# Patient Record
Sex: FEMALE | Race: ASIAN | NOT HISPANIC OR LATINO | Employment: FULL TIME | URBAN - METROPOLITAN AREA
[De-identification: names, ages, dates, MRNs, and addresses within clinical notes are randomized per-mention and may not be internally consistent; named-entity substitution may affect disease eponyms.]

---

## 2017-03-26 ENCOUNTER — OFFICE VISIT (OUTPATIENT)
Dept: URGENT CARE | Facility: PHYSICIAN GROUP | Age: 32
End: 2017-03-26
Payer: COMMERCIAL

## 2017-03-26 VITALS
TEMPERATURE: 97.9 F | SYSTOLIC BLOOD PRESSURE: 118 MMHG | RESPIRATION RATE: 16 BRPM | OXYGEN SATURATION: 98 % | HEART RATE: 60 BPM | DIASTOLIC BLOOD PRESSURE: 66 MMHG

## 2017-03-26 DIAGNOSIS — N92.6 IRREGULAR MENSES: ICD-10-CM

## 2017-03-26 DIAGNOSIS — N64.4 BREAST TENDERNESS: ICD-10-CM

## 2017-03-26 LAB
INT CON NEG: NEGATIVE
INT CON POS: POSITIVE
POC URINE PREGNANCY TEST: NEGATIVE

## 2017-03-26 PROCEDURE — 99214 OFFICE O/P EST MOD 30 MIN: CPT | Mod: 25 | Performed by: FAMILY MEDICINE

## 2017-03-26 PROCEDURE — 81025 URINE PREGNANCY TEST: CPT | Performed by: FAMILY MEDICINE

## 2017-03-26 ASSESSMENT — ENCOUNTER SYMPTOMS
FEVER: 0
ANOREXIA: 0
FATIGUE: 0
ABDOMINAL PAIN: 0
BREAST PAIN: 1
NAUSEA: 0
CHILLS: 1
VOMITING: 0

## 2017-03-26 NOTE — MR AVS SNAPSHOT
Danni Opal   3/26/2017 1:00 PM   Office Visit   MRN: [id number]    Department:  [facility]   Dept Phone:  [phone]    Description:  Female : 1985   Provider:  Bryan Wheeler M.D.           Reason for Visit     Breast Pain breast tenderness, abnormal period      Allergies as of 3/26/2017     Allergen Noted Reactions    Nkda [No Known Drug Allergy] 2013         You were diagnosed with     Breast tenderness   [127622]       Irregular menses   [733941]         Vital Signs     Blood Pressure Pulse Temperature Respirations Oxygen Saturation Smoking Status    118/66 mmHg 60 36.6 °C (97.9 °F) 16 98% Never Smoker       Basic Information     Date Of Birth Sex Race Ethnicity Preferred Language    1985 Female  Non- English      Problem List              ICD-10-CM Priority Class Noted - Resolved    Nipple discharge N64.52   2013 - Present    Acne L70.9   2013 - Present    Vertigo R42   2013 - Present    Gastritis K29.70   2013 - Present    Hyperprolactinemia (CMS-[facility]) E22.1   2013 - Present      Health Maintenance        Date Due Completion Dates    IMM DTaP/Tdap/Td Vaccine (1 - Tdap) 2005 ---    PAP SMEAR 2015 (Prv Comp)    Override on 2012: Previously completed    IMM INFLUENZA (1) 2016 ---            Results     POCT Pregnancy      Component Value Standard Range & Units    POC Urine Pregnancy Test Negative Negative    Internal Control Positive Positive     Internal Control Negative Negative                         Current Immunizations     MMR Vaccine 2014      Below and/or attached are the medications your provider expects you to take. Review all of your home medications and newly ordered medications with your provider and/or pharmacist. Follow medication instructions as directed by your provider and/or pharmacist. Please keep your medication list with you and share with your provider. Update the information when  medications are discontinued, doses are changed, or new medications (including over-the-counter products) are added; and carry medication information at all times in the event of emergency situations     Allergies:  NKDA - (reactions not documented)               Medications  Valid as of: March 26, 2017 -  1:45 PM    Generic Name Brand Name Tablet Size Instructions for use    Meclizine HCl (Tab) ANTIVERT 25 MG Take 1 Tab by mouth 3 times a day as needed.        Omeprazole (CAPSULE DELAYED RELEASE) PRILOSEC 20 MG Take 1 Cap by mouth every day.        .                 Medicines prescribed today were sent to:     [facility]/PHARMACY #[id number] - [place], [place] - [address]    [address] [place] [address]    Phone: [phone] Fax: [phone]    Open 24 Hours?: No      Medication refill instructions:       If your prescription bottle indicates you have medication refills left, it is not necessary to call your provider’s office. Please contact your pharmacy and they will refill your medication.    If your prescription bottle indicates you do not have any refills left, you may request refills at any time through one of the following ways: The online Point system (except Urgent Care), by calling your provider’s office, or by asking your pharmacy to contact your provider’s office with a refill request. Medication refills are processed only during regular business hours and may not be available until the next business day. Your provider may request additional information or to have a follow-up visit with you prior to refilling your medication.   *Please Note: Medication refills are assigned a new Rx number when refilled electronically. Your pharmacy may indicate that no refills were authorized even though a new prescription for the same medication is available at the pharmacy. Please request the medicine by name with the pharmacy before contacting your provider for a refill.           Point Access Code:  [email or web address]  Expires: 4/25/2017  1:45 PM    Your email address is not on file at [facility].  Email Addresses are required for you to sign up for [employer], please contact [phone] to verify your personal information and to provide your email address prior to attempting to register for [employer].    Danni Joseph  [address] [place] [address]    Soci[employer]  A secure, online tool to manage your health information     [employer]’s [employer]® is a secure, online tool that connects you to your personalized health information from the privacy of your home -- day or night - making it very easy for you to manage your healthcare. Once the activation process is completed, you can even access your medical information using the [employer] thuy, which is available for free in the Apple Thuy store or Google Play store.     To learn more about [employer], visit [email or web address]    There are two levels of access available (as shown below):   My Chart Features  [facility] Primary Care Doctor [facility]  Specialists [facility]  Urgent  Care Non-[facility] Primary Care Doctor   Email your healthcare team securely and privately 24/7 X X X    Manage appointments: schedule your next appointment; view details of past/upcoming appointments X      Request prescription refills. X      View recent personal medical records, including lab and immunizations X X X X   View health record, including health history, allergies, medications X X X X   Read reports about your outpatient visits, procedures, consult and ER notes X X X X   See your discharge summary, which is a recap of your hospital and/or ER visit that includes your diagnosis, lab results, and care plan X X  X     How to register for [employer]:  Once your e-mail address has been verified, follow the following steps to sign up for [employer].     1. Go to  [email or web address]  2. Click on the Sign Up Now box, which takes you to the New Member Sign Up page. You will need to provide the following  information:  a. Enter your [employer]â„¢ Access Code exactly as it appears at the top of this page. (You will not need to use this code after you’ve completed the sign-up process. If you do not sign up before the expiration date, you must request a new code.)   b. Enter your date of birth.   c. Enter your home email address.   d. Click Submit, and follow the next screen’s instructions.  3. Create a [employer]â„¢ ID. This will be your [employer]„¢ login ID and cannot be changed, so think of one that is secure and easy to remember.  4. Create a [employer]â„¢ password. You can change your password at any time.  5. Enter your Password Reset Question and Answer. This can be used at a later time if you forget your password.   6. Enter your e-mail address. This allows you to receive e-mail notifications when new information is available in [employer]„¢.  7. Click Sign Up. You can now view your health information.    For assistance activating your [employer]â„[employer] account, call [phone]

## 2017-03-26 NOTE — PROGRESS NOTES
Subjective:      Danni Joseph is a 32 y.o. female who presents with Breast Pain            Breast Pain  This is a new problem. The current episode started more than 1 month ago. The problem occurs constantly. The problem has been waxing and waning. Associated symptoms include chills. Pertinent negatives include no abdominal pain, anorexia, fatigue, fever, nausea or vomiting. Exacerbated by: work/stress. She has tried nothing for the symptoms.       Review of Systems   Constitutional: Positive for chills. Negative for fever and fatigue.   Gastrointestinal: Negative for nausea, vomiting, abdominal pain and anorexia.          Objective:     /66 mmHg  Pulse 60  Temp(Src) 36.6 °C (97.9 °F)  Resp 16  SpO2 98%     Physical Exam   Constitutional: She appears well-developed. No distress.   HENT:   Head: Normocephalic.   Neck: No thyromegaly present.   Cardiovascular: Normal rate, regular rhythm and normal heart sounds.  Exam reveals no friction rub.    No murmur heard.  Pulmonary/Chest: Effort normal. No respiratory distress. She has no wheezes.   Abdominal: Soft. She exhibits no distension. There is no tenderness. There is no rebound and no guarding.   No organomegaly   Neurological: She is alert.   Skin: Skin is dry. No rash noted. She is not diaphoretic.   Psychiatric: She has a normal mood and affect. Her behavior is normal.               Assessment/Plan:     1. Breast tenderness  POCT Pregnancy   2. Irregular menses  TSH+PRL+FSH+TESTT+LH+T4F+DH     prior history of irregular menses.   Patient currently under stress and she is , currently in [place]  CHECK LABS  F/u with PCP and consider imaging as noted in prior visits  Follow-up if symptoms worsen or fail to improve      ADDENDUM:  4/4/17 at 2:25 PM  Called and spoke to patient  All labs normal EXCEPT PROLACTIN   About 10 points higher than last year  Recommend f/u with Dr. Kelsey for consideration of imaging study to consider prolactinoma  Pt  agreed with plan and will f/u with Dr. Kelsey

## 2017-03-29 ENCOUNTER — HOSPITAL ENCOUNTER (OUTPATIENT)
Dept: LAB | Facility: MEDICAL CENTER | Age: 32
End: 2017-03-29
Attending: FAMILY MEDICINE
Payer: COMMERCIAL

## 2017-03-29 LAB
DHEA-S SERPL-MCNC: 218.6 UG/DL (ref 98.8–340)
FSH SERPL-ACNC: 3.3 MIU/ML
LH SERPL-ACNC: 6 IU/L
PROLACTIN SERPL-MCNC: 48.97 NG/ML (ref 2.8–26)
T4 FREE SERPL-MCNC: 0.81 NG/DL (ref 0.53–1.43)
TSH SERPL DL<=0.005 MIU/L-ACNC: 1.11 UIU/ML (ref 0.3–3.7)

## 2017-03-29 PROCEDURE — 84439 ASSAY OF FREE THYROXINE: CPT

## 2017-03-29 PROCEDURE — 83002 ASSAY OF GONADOTROPIN (LH): CPT

## 2017-03-29 PROCEDURE — 84146 ASSAY OF PROLACTIN: CPT

## 2017-03-29 PROCEDURE — 84270 ASSAY OF SEX HORMONE GLOBUL: CPT

## 2017-03-29 PROCEDURE — 84403 ASSAY OF TOTAL TESTOSTERONE: CPT

## 2017-03-29 PROCEDURE — 36415 COLL VENOUS BLD VENIPUNCTURE: CPT

## 2017-03-29 PROCEDURE — 83001 ASSAY OF GONADOTROPIN (FSH): CPT

## 2017-03-29 PROCEDURE — 82627 DEHYDROEPIANDROSTERONE: CPT

## 2017-03-29 PROCEDURE — 84443 ASSAY THYROID STIM HORMONE: CPT

## 2017-03-31 LAB
SHBG SERPL-SCNC: 59 NMOL/L (ref 30–135)
TESTOST FREE SERPL-MCNC: 5.4 PG/ML (ref 1.3–9.2)
TESTOST SERPL-MCNC: 46 NG/DL (ref 9–55)

## 2017-04-05 ENCOUNTER — TELEPHONE (OUTPATIENT)
Dept: MEDICAL GROUP | Facility: PHYSICIAN GROUP | Age: 32
End: 2017-04-05

## 2017-04-05 NOTE — TELEPHONE ENCOUNTER
Patient states she has an appointment next week.  Do you want her to be seen sooner?  Please advise.  Thank you.

## 2017-04-13 ENCOUNTER — OFFICE VISIT (OUTPATIENT)
Dept: MEDICAL GROUP | Facility: PHYSICIAN GROUP | Age: 32
End: 2017-04-13
Payer: COMMERCIAL

## 2017-04-13 VITALS
RESPIRATION RATE: 12 BRPM | OXYGEN SATURATION: 98 % | HEIGHT: 63 IN | SYSTOLIC BLOOD PRESSURE: 110 MMHG | WEIGHT: 116 LBS | HEART RATE: 80 BPM | TEMPERATURE: 97.6 F | BODY MASS INDEX: 20.55 KG/M2 | DIASTOLIC BLOOD PRESSURE: 76 MMHG

## 2017-04-13 DIAGNOSIS — E22.1 HYPERPROLACTINEMIA (HCC): ICD-10-CM

## 2017-04-13 DIAGNOSIS — N64.52 NIPPLE DISCHARGE: ICD-10-CM

## 2017-04-13 PROCEDURE — 99214 OFFICE O/P EST MOD 30 MIN: CPT | Performed by: FAMILY MEDICINE

## 2017-04-13 ASSESSMENT — PATIENT HEALTH QUESTIONNAIRE - PHQ9: CLINICAL INTERPRETATION OF PHQ2 SCORE: 0

## 2017-04-13 NOTE — PROGRESS NOTES
Chief Complaint   Patient presents with   • Follow-Up     labs       HISTORY OF PRESENT ILLNESS: Patient is a 32 y.o. female established patient here today for the following concerns:    1. Hyperprolactinemia (CMS-HCC)  2. Nipple discharge  Patient is here for follow up from .  She began having breast discharge bilaterally several years ago.  She was followed by endocrinology with serial prolactin levels that never seemed to increase.  She did not have imaging of the pituitary.  She reports that eventually the nipple discharge stopped and she stopped following the labs.  She began having nipple discharge again and having irregular periods. She had prolactin level drawn which showed it to be elevated again.  Pregnancy test was negative.  Remainder of hormones appear to be in normal range.  She denies headaches or tunnel vision.       Past Medical, Social, and Family history reviewed and updated in EPIC    Allergies:Nkda    Current Outpatient Prescriptions   Medication Sig Dispense Refill   • meclizine (ANTIVERT) 25 MG TABS Take 1 Tab by mouth 3 times a day as needed. 30 Tab 0   • omeprazole (PRILOSEC) 20 MG CPDR Take 1 Cap by mouth every day. 30 Cap 2     No current facility-administered medications for this visit.         ROS:  Review of Systems   Constitutional: Negative for fever, chills, weight loss and malaise/fatigue.   HENT: Negative for ear pain, nosebleeds, congestion, sore throat and neck pain.    Eyes: Negative for blurred vision.   Respiratory: Negative for cough, sputum production, shortness of breath and wheezing.    Cardiovascular: Negative for chest pain, palpitations,  and leg swelling.   Gastrointestinal: Negative for heartburn, nausea, vomiting, diarrhea and abdominal pain.   Genitourinary: Negative for dysuria, urgency and frequency.   Musculoskeletal: Negative for myalgias, back pain and joint pain.   Skin: Negative for rash and itching.   Neurological: Negative for dizziness, tingling,  "tremors, sensory change, focal weakness and headaches.   Endo/Heme/Allergies: Does not bruise/bleed easily.   Psychiatric/Behavioral: Negative for depression, anxiety, suicidal ideas, insomnia and memory loss.      Exam:  Blood pressure 110/76, pulse 80, temperature 36.4 °C (97.6 °F), resp. rate 12, height 1.6 m (5' 2.99\"), weight 52.617 kg (116 lb), last menstrual period 04/10/2017, SpO2 98 %.    General:  Well nourished, well developed in NAD  Head is grossly normal.  Neck: Supple without JVD   Pulmonary:  Normal effort.   Cardiovascular: Regular rate  Extremities: no clubbing, cyanosis, or edema.  Psych: affect appropriate      Please note that this dictation was created using voice recognition software. I have made every reasonable attempt to correct obvious errors, but I expect that there are errors of grammar and possibly content that I did not discover before finalizing the note.    Assessment/Plan:  1. Hyperprolactinemia (CMS-HCC)  - MR-BRAIN PITUITARY W/WO; Future  - PROLACTIN; Future    2. Nipple discharge  - MR-BRAIN PITUITARY W/WO; Future  - PROLACTIN; Future    Check MRI of pituitary, recheck prolactin in 2-3 months.  May consider bromocriptine or like medication           "

## 2017-04-13 NOTE — MR AVS SNAPSHOT
"Jaydonherbjuliocesar Joseph   2017 4:00 PM   Office Visit   MRN: [id number]    Department:  [facility]   Dept Phone:  [phone]    Description:  Female : 1985   Provider:  Micaela Kelsey M.D.           Reason for Visit     Follow-Up labs      Allergies as of 2017     Allergen Noted Reactions    Nkda [No Known Drug Allergy] 2013         You were diagnosed with     Hyperprolactinemia (CMS-HCC)   [399445]       Nipple discharge   [509098]         Vital Signs     Blood Pressure Pulse Temperature Respirations Height Weight    110/76 mmHg 80 36.4 °C (97.6 °F) 12 1.6 m (5' 2.99\") 52.617 kg (116 lb)    Body Mass Index Oxygen Saturation Last Menstrual Period Smoking Status          20.55 kg/m2 98% 04/10/2017 Never Smoker         Basic Information     Date Of Birth Sex Race Ethnicity Preferred Language    1985 Female  Non- English      Problem List              ICD-10-CM Priority Class Noted - Resolved    Nipple discharge N64.52   2013 - Present    Acne L70.9   2013 - Present    Vertigo R42   2013 - Present    Gastritis K29.70   2013 - Present    Hyperprolactinemia (CMS-HCC) E22.1   2013 - Present      Health Maintenance        Date Due Completion Dates    IMM DTaP/Tdap/Td Vaccine (1 - Tdap) 2005 ---    PAP SMEAR 2015 (Prv Comp)    Override on 2012: Previously completed            Current Immunizations     MMR Vaccine 2014      Below and/or attached are the medications your provider expects you to take. Review all of your home medications and newly ordered medications with your provider and/or pharmacist. Follow medication instructions as directed by your provider and/or pharmacist. Please keep your medication list with you and share with your provider. Update the information when medications are discontinued, doses are changed, or new medications (including over-the-counter products) are added; and carry medication information " at all times in the event of emergency situations     Allergies:  NKDA - (reactions not documented)               Medications  Valid as of: April 13, 2017 -  4:18 PM    Generic Name Brand Name Tablet Size Instructions for use    Meclizine HCl (Tab) ANTIVERT 25 MG Take 1 Tab by mouth 3 times a day as needed.        Omeprazole (CAPSULE DELAYED RELEASE) PRILOSEC 20 MG Take 1 Cap by mouth every day.        .                 Medicines prescribed today were sent to:     [facility]/PHARMACY #[id number] - [place], [place] - [address]    [address] [place] [address]    Phone: [phone] Fax: [phone]    Open 24 Hours?: No      Medication refill instructions:       If your prescription bottle indicates you have medication refills left, it is not necessary to call your provider’s office. Please contact your pharmacy and they will refill your medication.    If your prescription bottle indicates you do not have any refills left, you may request refills at any time through one of the following ways: The online [email or web address] system (except Urgent Care), by calling your provider’s office, or by asking your pharmacy to contact your provider’s office with a refill request. Medication refills are processed only during regular business hours and may not be available until the next business day. Your provider may request additional information or to have a follow-up visit with you prior to refilling your medication.   *Please Note: Medication refills are assigned a new Rx number when refilled electronically. Your pharmacy may indicate that no refills were authorized even though a new prescription for the same medication is available at the pharmacy. Please request the medicine by name with the pharmacy before contacting your provider for a refill.        Your To Do List     Future Labs/Procedures Complete By Expires    MR-BRAIN PITUITARY W/WO  As directed 4/13/2018    PROLACTIN  As directed 4/13/2018         [email or web address] Access Code:  [email or web address]  Expires: 4/25/2017  1:45 PM    [employer]  A secure, online tool to manage your health information     [employer]’s [employer]® is a secure, online tool that connects you to your personalized health information from the privacy of your home -- day or night - making it very easy for you to manage your healthcare. Once the activation process is completed, you can even access your medical information using the [employer] thuy, which is available for free in the Apple Thuy store or Google Play store.     [employer] provides the following levels of access (as shown below):   My Chart Features   [facility] Primary Care Doctor [facility]  Specialists [facility]  Urgent  Care Non-[facility]  Primary Care  Doctor   Email your healthcare team securely and privately 24/7 X X X    Manage appointments: schedule your next appointment; view details of past/upcoming appointments X      Request prescription refills. X      View recent personal medical records, including lab and immunizations X X X X   View health record, including health history, allergies, medications X X X X   Read reports about your outpatient visits, procedures, consult and ER notes X X X X   See your discharge summary, which is a recap of your hospital and/or ER visit that includes your diagnosis, lab results, and care plan. X X       How to register for [employer]:  1. Go to  [email or web address].  2. Click on the Sign Up Now box, which takes you to the New Member Sign Up page. You will need to provide the following information:  a. Enter your [employer] Access Code exactly as it appears at the top of this page. (You will not need to use this code after you’ve completed the sign-up process. If you do not sign up before the expiration date, you must request a new code.)   b. Enter your date of birth.   c. Enter your home email address.   d. Click Submit, and follow the next screen’s instructions.  3. Create a [employer] ID. This will be your [employer] login ID and cannot be  changed, so think of one that is secure and easy to remember.  4. Create a [employer] password. You can change your password at any time.  5. Enter your Password Reset Question and Answer. This can be used at a later time if you forget your password.   6. Enter your e-mail address. This allows you to receive e-mail notifications when new information is available in [employer].  7. Click Sign Up. You can now view your health information.    For assistance activating your [employer] account, call [phone]

## 2017-04-25 ENCOUNTER — HOSPITAL ENCOUNTER (OUTPATIENT)
Dept: RADIOLOGY | Facility: MEDICAL CENTER | Age: 32
End: 2017-04-25
Attending: FAMILY MEDICINE
Payer: COMMERCIAL

## 2017-04-25 DIAGNOSIS — D35.2 PITUITARY ADENOMA (HCC): ICD-10-CM

## 2017-04-25 DIAGNOSIS — E22.1 HYPERPROLACTINEMIA (HCC): ICD-10-CM

## 2017-04-25 DIAGNOSIS — N64.52 NIPPLE DISCHARGE: ICD-10-CM

## 2017-04-25 PROCEDURE — 70553 MRI BRAIN STEM W/O & W/DYE: CPT

## 2017-04-25 PROCEDURE — 700117 HCHG RX CONTRAST REV CODE 255: Performed by: FAMILY MEDICINE

## 2017-04-25 PROCEDURE — A9579 GAD-BASE MR CONTRAST NOS,1ML: HCPCS | Performed by: FAMILY MEDICINE

## 2017-04-25 RX ADMIN — GADODIAMIDE 15 ML: 287 INJECTION INTRAVENOUS at 10:03

## 2017-04-26 ENCOUNTER — TELEPHONE (OUTPATIENT)
Dept: MEDICAL GROUP | Facility: PHYSICIAN GROUP | Age: 32
End: 2017-04-26

## 2017-04-26 RX ORDER — CABERGOLINE 0.5 MG/1
0.25 TABLET ORAL
Qty: 12 TAB | Refills: 0 | Status: SHIPPED | OUTPATIENT
Start: 2017-04-26 | End: 2017-06-06 | Stop reason: SINTOL

## 2017-04-26 NOTE — TELEPHONE ENCOUNTER
Please call Danni, we need to see her to discuss the plan for her nipple discharge and the findings on MRI.  I have started the referral to endocrinology

## 2017-04-26 NOTE — TELEPHONE ENCOUNTER
----- Message from Micaela Kelsey M.D. sent at 4/26/2017  7:52 AM PDT -----  Patient needs follow up this week to discuss treatment plan.

## 2017-05-02 ENCOUNTER — TELEPHONE (OUTPATIENT)
Dept: MEDICAL GROUP | Facility: PHYSICIAN GROUP | Age: 32
End: 2017-05-02

## 2017-05-02 NOTE — TELEPHONE ENCOUNTER
ESTABLISHED PATIENT PRE-VISIT PLANNING     Note: Patient will not be contacted if there is no indication to call.     1.  Reviewed note from last office visit with PCP and/or other med group provider: Yes    2.  If any orders were placed at last visit, do we have Results/Consult Notes?        •  Labs - Labs were not ordered at last office visit.       •  Imaging - Imaging ordered, completed and results are in chart.       •  Referrals - No referrals were ordered at last office visit.    3.  Immunizations were updated in Epic using WebIZ?: Epic matches WebIZ       •  Web Iz Recommendations: TDAP VARICELLA (Chicken Pox)     4.  Patient is due for the following Health Maintenance Topics:   Health Maintenance Due   Topic Date Due   • IMM DTaP/Tdap/Td Vaccine (1 - Tdap) 01/12/2005   • PAP SMEAR  07/31/2015           5.  Patient was not informed to arrive 15 min prior to their scheduled appointment and bring in their medication bottles.

## 2017-05-03 ENCOUNTER — OFFICE VISIT (OUTPATIENT)
Dept: MEDICAL GROUP | Facility: PHYSICIAN GROUP | Age: 32
End: 2017-05-03
Payer: COMMERCIAL

## 2017-05-03 VITALS
DIASTOLIC BLOOD PRESSURE: 80 MMHG | HEART RATE: 70 BPM | OXYGEN SATURATION: 98 % | TEMPERATURE: 98.4 F | BODY MASS INDEX: 20.8 KG/M2 | RESPIRATION RATE: 12 BRPM | WEIGHT: 113 LBS | HEIGHT: 62 IN | SYSTOLIC BLOOD PRESSURE: 120 MMHG

## 2017-05-03 DIAGNOSIS — N64.52 NIPPLE DISCHARGE: ICD-10-CM

## 2017-05-03 DIAGNOSIS — D35.2 PROLACTINOMA (HCC): Primary | ICD-10-CM

## 2017-05-03 DIAGNOSIS — E22.1 HYPERPROLACTINEMIA (HCC): ICD-10-CM

## 2017-05-03 PROCEDURE — 99213 OFFICE O/P EST LOW 20 MIN: CPT | Performed by: NURSE PRACTITIONER

## 2017-05-03 NOTE — MR AVS SNAPSHOT
"Danni Joseph   5/3/2017 4:00 PM   Office Visit   MRN: [id number]    Department:  [facility]   Dept Phone:  [phone]    Description:  Female : 1985   Provider:  EZEQUIEL Dowell           Reason for Visit     Follow-Up MRI      Allergies as of 5/3/2017     Allergen Noted Reactions    Nkda [No Known Drug Allergy] 2013         You were diagnosed with     Prolactinoma (CMS-HCC)   [944145]  -  Primary     Hyperprolactinemia (CMS-HCC)   [846436]       Nipple discharge   [832170]         Vital Signs     Blood Pressure Pulse Temperature Respirations Height Weight    120/80 mmHg 70 36.9 °C (98.4 °F) 12 1.575 m (5' 2\") 51.256 kg (113 lb)    Body Mass Index Oxygen Saturation Last Menstrual Period Breastfeeding? Smoking Status       20.66 kg/m2 98% 04/10/2017 No Never Smoker        Basic Information     Date Of Birth Sex Race Ethnicity Preferred Language    1985 Female  Non- English      Your appointments     2017  4:10 PM   New Patient with Julián Waller M.D.   [facility] End[facility])    [address], [address]  [place] [address] [phone]           Please bring Photo ID, Insurance Cards, All Medication Bottles and copies of any legal documents (such as Living Will, Power of ) If speaking a language besides English please bring an adult . Please arrive 30 minutes prior for check in and registration. You will be receiving a confirmation call a few days before your appointment from our automated call confirmation system.              Problem List              ICD-10-CM Priority Class Noted - Resolved    Nipple discharge N64.52   2013 - Present    Acne L70.9   2013 - Present    Vertigo R42   2013 - Present    Gastritis K29.70   2013 - Present    Hyperprolactinemia (CMS-HCC) E22.1   2013 - Present    Prolactinoma (CMS-HCC) D35.2   5/3/2017 - Present      Health Maintenance    "    Date Due Completion Dates    IMM DTaP/Tdap/Td Vaccine (1 - Tdap) 1/12/2005 ---    PAP SMEAR 7/31/2015 7/31/2012 (Prv Comp)    Override on 7/31/2012: Previously completed            Current Immunizations     MMR Vaccine 1/6/2014      Below and/or attached are the medications your provider expects you to take. Review all of your home medications and newly ordered medications with your provider and/or pharmacist. Follow medication instructions as directed by your provider and/or pharmacist. Please keep your medication list with you and share with your provider. Update the information when medications are discontinued, doses are changed, or new medications (including over-the-counter products) are added; and carry medication information at all times in the event of emergency situations     Allergies:  NKDA - (reactions not documented)               Medications  Valid as of: May 03, 2017 -  4:50 PM    Generic Name Brand Name Tablet Size Instructions for use    Cabergoline (Tab) DOSTINEX 0.5 MG Take 0.5 Tabs by mouth 2X A WEEK.        Meclizine HCl (Tab) ANTIVERT 25 MG Take 1 Tab by mouth 3 times a day as needed.        Omeprazole (CAPSULE DELAYED RELEASE) PRILOSEC 20 MG Take 1 Cap by mouth every day.        .                 Medicines prescribed today were sent to:     [facility]/PHARMACY #[id number] - [place] - [address] [address]    Phone: [phone] Fax: [phone]    Open 24 Hours?: No      Medication refill instructions:       If your prescription bottle indicates you have medication refills left, it is not necessary to call your provider’s office. Please contact your pharmacy and they will refill your medication.    If your prescription bottle indicates you do not have any refills left, you may request refills at any time through one of the following ways: The online [employer] system (except Urgent Care), by calling your provider’s office, or by asking your pharmacy to contact your  provider’s office with a refill request. Medication refills are processed only during regular business hours and may not be available until the next business day. Your provider may request additional information or to have a follow-up visit with you prior to refilling your medication.   *Please Note: Medication refills are assigned a new Rx number when refilled electronically. Your pharmacy may indicate that no refills were authorized even though a new prescription for the same medication is available at the pharmacy. Please request the medicine by name with the pharmacy before contacting your provider for a refill.           [employer] Access Code: Activation code not generated  Current [employer] Status: Active

## 2017-05-03 NOTE — PROGRESS NOTES
Chief Complaint   Patient presents with   • Follow-Up     MRI       HISTORY OF PRESENT ILLNESS: Patient is a 32 y.o. female established patient who presents today to review recent imaging results.  She is a patient of Dr. Kelsey.    1. Prolactinoma (CMS-[facility]) 2. Hyperprolactinemia (CMS-[facility]) 3. Nipple discharge  Patient's most recent imaging results reviewed.  MRI scan of the brain reveals suspicious for 4 millimeter prolactinoma.  Her prolactin levels have been monitored consistently since 2013.  She states that this is the first brain MRI that has been performed.  Suspicion started when patient was expressing irregular menstrual cycles and most recently nipple discharge.  She also reports frequent headaches.  Patient has already scheduled an evaluation with no chronology, she is scheduled for June 12.  In the meantime, her primary care provider, Dr. Kelsey, has recommended that she start Cabergoline 0.25 milligrams twice weekly.  She denies any new or change to her symptoms.  Denies any known family history of similar symptoms.  Patient is not sexually active, she does not have plans to become pregnant anytime soon.    Allergies:Nkda    Current Outpatient Prescriptions Ordered in [facility]   Medication Sig Dispense Refill   • cabergoline (DOSTINEX) 0.5 MG tablet Take 0.5 Tabs by mouth 2X A WEEK. 12 Tab 0   • meclizine (ANTIVERT) 25 MG TABS Take 1 Tab by mouth 3 times a day as needed. 30 Tab 0   • omeprazole (PRILOSEC) 20 MG CPDR Take 1 Cap by mouth every day. 30 Cap 2     No current Epic-ordered facility-administered medications on file.       Past Medical History   Diagnosis Date   • Acne 7/31/2013   • Nipple discharge 7/31/2013       Social History   Substance Use Topics   • Smoking status: Never Smoker    • Smokeless tobacco: Never Used   • Alcohol Use: No       Family Status   Relation Status Death Age   • Mother Alive    • Father Alive      Family History   Problem Relation Age of Onset   • Hypertension Mother   "      ROS: see above    Review of Systems   Constitutional: Negative for fever, chills, weight loss and malaise/fatigue.   HENT: Negative for ear pain, nosebleeds, congestion, sore throat and neck pain.    Eyes: Negative for blurred vision.   Respiratory: Negative for cough, sputum production, shortness of breath and wheezing.    Cardiovascular: Negative for chest pain, palpitations, orthopnea and leg swelling.   Gastrointestinal: Negative for heartburn, nausea, vomiting and abdominal pain.   Genitourinary: Negative for dysuria, urgency and frequency.   Musculoskeletal: Negative for myalgias, back pain and joint pain.   Skin: Negative for rash and itching.   Neurological: Negative for dizziness, tingling, tremors, sensory change, focal weakness and headaches.   Endo/Heme/Allergies: Does not bruise/bleed easily.   Psychiatric/Behavioral: Negative for depression, suicidal ideas and memory loss.  The patient is not nervous/anxious and does not have insomnia.        Exam:  Blood pressure 120/80, pulse 70, temperature 36.9 °C (98.4 °F), resp. rate 12, height 1.575 m (5' 2\"), weight 51.256 kg (113 lb), last menstrual period 04/10/2017, SpO2 98 %, not currently breastfeeding.  General:  Well nourished, well developed female in NAD  Head is grossly normal.  Neck: Thyroid is not enlarged.  Pulmonary: Normal effort.   Cardiovascular: Regular rate.   Extremities: no clubbing, cyanosis, or edema.  Psych:  Mood and affect are normal.  Answering questions appropriately with good eye contact.      Please note that this dictation was created using voice recognition software. I have made every reasonable attempt to correct obvious errors, but I expect that there are errors of grammar and possibly content that I did not discover before finalizing the note.    Assessment/Plan:    1. Prolactinoma (CMS-HCC)     2. Hyperprolactinemia (CMS-HCC)     3. Nipple discharge          1.  Proceed with new medication started as recommended by " Low.  Discussed medication and patient given written information regards to the medication.  2.  Proceed with evaluation by endocrinology.  3.  Try to reassure patient of the benign likelihood of the diagnosis.  4.  Return to clinic with PCP after evaluation by the endocrinologist, sooner if needed.

## 2017-05-18 ENCOUNTER — TELEPHONE (OUTPATIENT)
Dept: MEDICAL GROUP | Facility: PHYSICIAN GROUP | Age: 32
End: 2017-05-18

## 2017-05-18 NOTE — TELEPHONE ENCOUNTER
Patient states that ever since she has started taking the Cabergoline, she has been getting blisters on her lips.  She is also having a burning sensation on her lips.  Please advise.    Thank you.

## 2017-06-01 ENCOUNTER — OFFICE VISIT (OUTPATIENT)
Dept: URGENT CARE | Facility: PHYSICIAN GROUP | Age: 32
End: 2017-06-01
Payer: COMMERCIAL

## 2017-06-01 VITALS
TEMPERATURE: 98.5 F | WEIGHT: 113 LBS | SYSTOLIC BLOOD PRESSURE: 98 MMHG | HEART RATE: 76 BPM | DIASTOLIC BLOOD PRESSURE: 66 MMHG | RESPIRATION RATE: 12 BRPM | OXYGEN SATURATION: 96 % | BODY MASS INDEX: 20.8 KG/M2 | HEIGHT: 62 IN

## 2017-06-01 DIAGNOSIS — Z86.018 HISTORY OF PROLACTINOMA: ICD-10-CM

## 2017-06-01 DIAGNOSIS — L71.0 DERMATITIS, PERIORAL: ICD-10-CM

## 2017-06-01 PROCEDURE — 99214 OFFICE O/P EST MOD 30 MIN: CPT | Performed by: PHYSICIAN ASSISTANT

## 2017-06-01 RX ORDER — METHYLPREDNISOLONE SODIUM SUCCINATE 125 MG/2ML
125 INJECTION, POWDER, LYOPHILIZED, FOR SOLUTION INTRAMUSCULAR; INTRAVENOUS ONCE
Status: COMPLETED | OUTPATIENT
Start: 2017-06-01 | End: 2017-06-01

## 2017-06-01 RX ORDER — METHYLPREDNISOLONE SODIUM SUCCINATE 40 MG/ML
40 INJECTION, POWDER, LYOPHILIZED, FOR SOLUTION INTRAMUSCULAR; INTRAVENOUS ONCE
Status: DISCONTINUED | OUTPATIENT
Start: 2017-06-01 | End: 2017-06-01 | Stop reason: CLARIF

## 2017-06-01 RX ADMIN — METHYLPREDNISOLONE SODIUM SUCCINATE 125 MG: 125 INJECTION, POWDER, LYOPHILIZED, FOR SOLUTION INTRAMUSCULAR; INTRAVENOUS at 18:03

## 2017-06-01 ASSESSMENT — ENCOUNTER SYMPTOMS
WHEEZING: 0
BLURRED VISION: 0
SHORTNESS OF BREATH: 0
EYE REDNESS: 0
ABDOMINAL PAIN: 0
EYE DISCHARGE: 0
FEVER: 0
SPUTUM PRODUCTION: 0
PALPITATIONS: 0
COUGH: 0
VOMITING: 0
CHILLS: 0
HEADACHES: 0
SORE THROAT: 0

## 2017-06-01 NOTE — MR AVS SNAPSHOT
"        Jaydonherbjuliocesar Joseph   2017 5:30 PM   Office Visit   MRN: [id number]    Department:  [facility]   Dept Phone:  [phone]    Description:  Female : 1985   Provider:  Missael Huang PA-C           Reason for Visit     Oral Swelling lip swelling, itching, painful bump.  Now having itching on the face       Allergies as of 2017     Allergen Noted Reactions    Nkda [No Known Drug Allergy] 2013         You were diagnosed with     Dermatitis, perioral   [627763]       History of prolactinoma   [1330615]         Vital Signs     Blood Pressure Pulse Temperature Respirations Height Weight    98/66 mmHg 76 36.9 °C (98.5 °F) 12 1.575 m (5' 2\") 51.256 kg (113 lb)    Body Mass Index Oxygen Saturation Smoking Status             20.66 kg/m2 96% Never Smoker          Basic Information     Date Of Birth Sex Race Ethnicity Preferred Language    1985 Female  Non- English      Your appointments     2017  4:10 PM   New Patient with Julián Waller M.D.   [facility]    [address], [address]  [place] [address] [phone]           Please bring Photo ID, Insurance Cards, All Medication Bottles and copies of any legal documents (such as Living Will, Power of ) If speaking a language besides English please bring an adult . Please arrive 30 minutes prior for check in and registration. You will be receiving a confirmation call a few days before your appointment from our automated call confirmation system.              Problem List              ICD-10-CM Priority Class Noted - Resolved    Nipple discharge N64.52   2013 - Present    Acne L70.9   2013 - Present    Vertigo R42   2013 - Present    Gastritis K29.70   2013 - Present    Hyperprolactinemia (CMS-HCC) E22.1   2013 - Present    Prolactinoma (CMS-[facility]) D35.2   5/3/2017 - Present      Health Maintenance        Date Due Completion " Dates    IMM DTaP/Tdap/Td Vaccine (1 - Tdap) 1/12/2005 ---    PAP SMEAR 7/31/2015 7/31/2012 (Prv Comp)    Override on 7/31/2012: Previously completed            Current Immunizations     MMR Vaccine 1/6/2014      Below and/or attached are the medications your provider expects you to take. Review all of your home medications and newly ordered medications with your provider and/or pharmacist. Follow medication instructions as directed by your provider and/or pharmacist. Please keep your medication list with you and share with your provider. Update the information when medications are discontinued, doses are changed, or new medications (including over-the-counter products) are added; and carry medication information at all times in the event of emergency situations     Allergies:  NKDA - (reactions not documented)               Medications  Valid as of: June 01, 2017 -  7:21 PM    Generic Name Brand Name Tablet Size Instructions for use    Cabergoline (Tab) DOSTINEX 0.5 MG Take 0.5 Tabs by mouth 2X A WEEK.        Meclizine HCl (Tab) ANTIVERT 25 MG Take 1 Tab by mouth 3 times a day as needed.        Omeprazole (CAPSULE DELAYED RELEASE) PRILOSEC 20 MG Take 1 Cap by mouth every day.        .                 Medicines prescribed today were sent to:     [facility]/PHARMACY #[id number] - [place] - [address] [address]    Phone: [phone] Fax: [phone]    Open 24 Hours?: No      Medication refill instructions:       If your prescription bottle indicates you have medication refills left, it is not necessary to call your provider’s office. Please contact your pharmacy and they will refill your medication.    If your prescription bottle indicates you do not have any refills left, you may request refills at any time through one of the following ways: The online [employer] system (except Urgent Care), by calling your provider’s office, or by asking your pharmacy to contact your provider’s office with  a refill request. Medication refills are processed only during regular business hours and may not be available until the next business day. Your provider may request additional information or to have a follow-up visit with you prior to refilling your medication.   *Please Note: Medication refills are assigned a new Rx number when refilled electronically. Your pharmacy may indicate that no refills were authorized even though a new prescription for the same medication is available at the pharmacy. Please request the medicine by name with the pharmacy before contacting your provider for a refill.           [employer] Access Code: Activation code not generated  Current [employer] Status: Active

## 2017-06-02 NOTE — PROGRESS NOTES
"Subjective:      Danni Joseph is a 32 y.o. female who presents with Oral Swelling            HPI   Pt is 33 y/o female who presents itchy lips and swelling since last night. Pt. Reports hx of same after being started on recent Cabergoline- however she discontinued this several days ago.   She reports small little blisters this morning, however putting on vaseline this seemed to help some. She denies any tongue swelling, however she did bite her the tip of her tongue the other day and developed a small little sore on the tip. She denies any other rash, SOB, or wheezing.     Review of Systems   Constitutional: Negative for fever, chills and malaise/fatigue.   HENT: Negative for ear discharge, ear pain and sore throat.         Lip swelling and itching.    Eyes: Negative for blurred vision, discharge and redness.   Respiratory: Negative for cough, sputum production, shortness of breath and wheezing.    Cardiovascular: Negative for chest pain and palpitations.   Gastrointestinal: Negative for vomiting and abdominal pain.   Skin: Negative for itching and rash.   Neurological: Negative for headaches.          Objective:     BP 98/66 mmHg  Pulse 76  Temp(Src) 36.9 °C (98.5 °F)  Resp 12  Ht 1.575 m (5' 2\")  Wt 51.256 kg (113 lb)  BMI 20.66 kg/m2  SpO2 96%   PMH:  has a past medical history of Acne (7/31/2013) and Nipple discharge (7/31/2013).  MEDS:   Current outpatient prescriptions:   •  cabergoline (DOSTINEX) 0.5 MG tablet, Take 0.5 Tabs by mouth 2X A WEEK., Disp: 12 Tab, Rfl: 0  •  meclizine (ANTIVERT) 25 MG TABS, Take 1 Tab by mouth 3 times a day as needed., Disp: 30 Tab, Rfl: 0  •  omeprazole (PRILOSEC) 20 MG CPDR, Take 1 Cap by mouth every day., Disp: 30 Cap, Rfl: 2    Current facility-administered medications:   •  methylPREDNISolone sod succ (SOLU-MEDROL) 125 MG injection 125 mg, 125 mg, Intramuscular, Once, Missael Huang PA-C  ALLERGIES:   Allergies   Allergen Reactions   • Nkda [No Known Drug Allergy]  "     SURGHX:   Past Surgical History   Procedure Laterality Date   • Eye surgery       KENNY     SOCHX:  reports that she has never smoked. She has never used smokeless tobacco. She reports that she does not drink alcohol or use illicit drugs.  FH: Family history was reviewed, no pertinent findings to report    Physical Exam   Constitutional: She is oriented to person, place, and time. She appears well-developed and well-nourished.   HENT:   Head: Normocephalic and atraumatic.   Right Ear: External ear normal.   Left Ear: External ear normal.   Nose: Nose normal.   Mouth/Throat: Oropharynx is clear and moist. No oropharyngeal exudate.   Small ulceration to the tip of the tongue. Upper and lower lips- minimal edema- without evidence of other skin changes. Other mucosa= without lesions/vesicles or skin changes.    Eyes: EOM are normal. Pupils are equal, round, and reactive to light.   Neck: Normal range of motion. Neck supple.   Cardiovascular: Normal rate and regular rhythm.    Pulmonary/Chest: Effort normal and breath sounds normal. No respiratory distress.   Musculoskeletal: Normal range of motion. She exhibits no edema.   Lymphadenopathy:     She has no cervical adenopathy.   Neurological: She is alert and oriented to person, place, and time.   Skin: Skin is warm. No rash noted.   Psychiatric: She has a normal mood and affect. Her behavior is normal.   Vitals reviewed.              Assessment/Plan:     1. Dermatitis, perioral  - methylPREDNISolone sod succ (SOLU-MEDROL) 125 MG injection 125 mg; 2 mL by Intramuscular route Once.    At this time the Cabergoline should no longer be a causation- suspect perioral derm. At this time- will trial steroid- she is to start on Zantac and Benadryl- utilize vaseline- avoid itching.   Pt. Without any mucosa edema, stridor, or wheezing.   Pt .is to keep upcoming appt. With Endocrinology for further discussion about medications- and is to avoid further use of Cabergoline.    Patient given precautionary s/sx that mandate immediate follow up and evaluation in the ED. Advised of risks of not doing so.    DDX, Supportive care, and indications for immediate follow-up discussed with patient.    Instructed to return to clinic or nearest emergency department if we are not available for any change in condition, further concerns, or worsening of symptoms.    The patient demonstrated a good understanding and agreed with the treatment plan.

## 2017-06-06 ENCOUNTER — OFFICE VISIT (OUTPATIENT)
Dept: ENDOCRINOLOGY | Facility: MEDICAL CENTER | Age: 32
End: 2017-06-06
Payer: COMMERCIAL

## 2017-06-06 VITALS
SYSTOLIC BLOOD PRESSURE: 118 MMHG | WEIGHT: 116 LBS | OXYGEN SATURATION: 97 % | BODY MASS INDEX: 20.55 KG/M2 | DIASTOLIC BLOOD PRESSURE: 70 MMHG | HEIGHT: 63 IN | HEART RATE: 85 BPM

## 2017-06-06 DIAGNOSIS — E22.1 HYPERPROLACTINEMIA (HCC): ICD-10-CM

## 2017-06-06 DIAGNOSIS — E55.9 VITAMIN D DEFICIENCY: ICD-10-CM

## 2017-06-06 DIAGNOSIS — R53.83 FATIGUE, UNSPECIFIED TYPE: ICD-10-CM

## 2017-06-06 DIAGNOSIS — E53.8 VITAMIN B12 DEFICIENCY: ICD-10-CM

## 2017-06-06 DIAGNOSIS — E27.1 ADRENAL INSUFFICIENCY (ADDISON'S DISEASE) (HCC): ICD-10-CM

## 2017-06-06 DIAGNOSIS — D35.2 PROLACTINOMA (HCC): ICD-10-CM

## 2017-06-06 DIAGNOSIS — N64.52 NIPPLE DISCHARGE: ICD-10-CM

## 2017-06-06 PROCEDURE — 99204 OFFICE O/P NEW MOD 45 MIN: CPT | Performed by: INTERNAL MEDICINE

## 2017-06-06 RX ORDER — BROMOCRIPTINE MESYLATE 2.5 MG/1
2.5 TABLET ORAL DAILY
Qty: 30 TAB | Refills: 3 | Status: SHIPPED | OUTPATIENT
Start: 2017-06-06 | End: 2017-07-06

## 2017-06-06 NOTE — MR AVS SNAPSHOT
"Jaydonherbjuliocesar Joseph   2017 4:30 PM   Office Visit   MRN: [id number]    Department:  Endocrinology [facility]   Dept Phone:  [phone]    Description:  Female : 1985   Provider:  Julián Waller M.D.           Allergies as of 2017     Allergen Noted Reactions    Benadryl Allergy 2017       Nkda [No Known Drug Allergy] 2013         You were diagnosed with     Hyperprolactinemia (CMS-HCC)   [914338]       Prolactinoma (CMS-HCC)   [642091]       Nipple discharge   [647595]       Vitamin D deficiency   [0475947]       Vitamin B12 deficiency   [528021]       Fatigue, unspecified type   [8228814]       Adrenal insufficiency (Kevin's disease) (CMS-HCC)   [559946]         Vital Signs     Blood Pressure Pulse Height Weight Body Mass Index Oxygen Saturation    118/70 mmHg 85 1.6 m (5' 2.99\") 52.617 kg (116 lb) 20.55 kg/m2 97%    Smoking Status                   Never Smoker            Basic Information     Date Of Birth Sex Race Ethnicity Preferred Language    1985 Female  Non- English      Your appointments     2017  5:10 PM   Established Patient with Julián Waller M.D.   [facility]    [address], [address]  [place] [address] [phone]           You will be receiving a confirmation call a few days before your appointment from our automated call confirmation system.              Problem List              ICD-10-CM Priority Class Noted - Resolved    Nipple discharge N64.52   2013 - Present    Acne L70.9   2013 - Present    Vertigo R42   2013 - Present    Gastritis K29.70   2013 - Present    Hyperprolactinemia (CMS-HCC) E22.1   2013 - Present    Prolactinoma (CMS-HCC) D35.2   5/3/2017 - Present      Health Maintenance        Date Due Completion Dates    IMM DTaP/Tdap/Td Vaccine (1 - Tdap) 2005 ---    PAP SMEAR 2015 (Prv Comp)    Override on 2012: Previously " completed            Current Immunizations     MMR Vaccine 1/6/2014      Below and/or attached are the medications your provider expects you to take. Review all of your home medications and newly ordered medications with your provider and/or pharmacist. Follow medication instructions as directed by your provider and/or pharmacist. Please keep your medication list with you and share with your provider. Update the information when medications are discontinued, doses are changed, or new medications (including over-the-counter products) are added; and carry medication information at all times in the event of emergency situations     Allergies:  BENADRYL ALLERGY - (reactions not documented)     NKDA - (reactions not documented)               Medications  Valid as of: June 06, 2017 -  5:09 PM    Generic Name Brand Name Tablet Size Instructions for use    Bromocriptine Mesylate (Tab) PARLODEL 2.5 MG Take 1 Tab by mouth every day for 30 days.        .                 Medicines prescribed today were sent to:     [facility]/PHARMACY #[id number] - [place] - [address]    [address] [place] [address]    Phone: [phone] Fax: [phone]    Open 24 Hours?: No      Medication refill instructions:       If your prescription bottle indicates you have medication refills left, it is not necessary to call your provider’s office. Please contact your pharmacy and they will refill your medication.    If your prescription bottle indicates you do not have any refills left, you may request refills at any time through one of the following ways: The online [employer] system (except Urgent Care), by calling your provider’s office, or by asking your pharmacy to contact your provider’s office with a refill request. Medication refills are processed only during regular business hours and may not be available until the next business day. Your provider may request additional information or to have a follow-up visit with you prior to refilling  your medication.   *Please Note: Medication refills are assigned a new Rx number when refilled electronically. Your pharmacy may indicate that no refills were authorized even though a new prescription for the same medication is available at the pharmacy. Please request the medicine by name with the pharmacy before contacting your provider for a refill.        Your To Do List     Future Labs/Procedures Complete By Expires    ACTH  As directed 6/6/2018    CORTISOL  As directed 6/6/2018    PROLACTIN  As directed 6/6/2018    VITAMIN B12  As directed 6/6/2018    VITAMIN D,25 HYDROXY  As directed 6/6/2018         [employer] Access Code: Activation code not generated  Current [employer] Status: Active

## 2017-06-07 NOTE — PROGRESS NOTES
New Patient Consult Note  Primary care physician: Micaela Kelsey M.D.    Reason for consult: Hyperprolactinemia     HPI:  Danni Joseph is a 32 y.o. old patient who comes in today for evaluation of hyperprolactinemia on cabergoline therapy. It seems that she may be having some type of allergic reaction to cabergoline. At times she takes it she develops jade -oral and lip swelling that last for days. If she stops the cabergoline swelling goes away gradually and it comes back again when she takes the subsequent dose.  The swelling is relieved by Benadryl. She denies any nipple discharge since the last one week. She does state to have some nipple discharge a month ago. She also complains of occasional dizziness he states to have this complaint for a very long time even before she came to [place] few years ago. In [place] she was told that her dizziness is from low blood pressure. This dizziness is unrelated to her cabergoline as she had even before the start of the treatment. She also complains of feeling nauseous every single morning since last years. Does state that her nausea gets worse when she is sick for example when she has flulike symptoms or upper respiratory tract infections etc. she does complain of extreme fatigue throughout the day. She sleeps for many hours but she never feels refreshed and she wakes up tired every day in the morning.    ROS:  Constitutional: Fatigue, No weight loss  Cardiac: No palpitations or racing heart  Resp: No shortness of breath  Neuro:On-and-off dizziness, No numbness or tinging in feet  Breast: No Nipple Discharge since last one week, nipple discharge on and off prior to that  Endo: No heat or cold intolerance, no polyuria or polydipsia  GI: Early morning nausea every single day    All other systems were reviewed and were negative.    Past Medical History:  Patient Active Problem List    Diagnosis Date Noted   • Prolactinoma (CMS-[facility]) 05/03/2017   • Vertigo 11/05/2013   •  "Gastritis 11/05/2013   • Hyperprolactinemia (CMS-HCC) 11/05/2013   • Nipple discharge 07/31/2013   • Acne 07/31/2013       Past Surgical History:  Past Surgical History   Procedure Laterality Date   • Eye surgery       LASIC       Allergies:  Benadryl allergy and Nkda    Social History:  Social History     Social History   • Marital Status: Single     Spouse Name: N/A   • Number of Children: N/A   • Years of Education: N/A     Occupational History   •        AT [employer]      Social History Main Topics   • Smoking status: Never Smoker    • Smokeless tobacco: Never Used   • Alcohol Use: No   • Drug Use: No   • Sexual Activity: Not Currently     Other Topics Concern   • Not on file     Social History Narrative       Family History:  Family History   Problem Relation Age of Onset   • Hypertension Mother        Medications:    Current outpatient prescriptions:   •  bromocriptine (PARLODEL) 2.5 MG Tab, Take 1 Tab by mouth every day for 30 days., Disp: 30 Tab, Rfl: 3    Labs:    Physical Examination:  Vital signs: /70 mmHg  Pulse 85  Ht 1.6 m (5' 2.99\")  Wt 52.617 kg (116 lb)  BMI 20.55 kg/m2  SpO2 97%  General: No apparent distress, cooperative, pain  Eyes: No scleral icterus or discharge  ENMT: Normal on external inspection of nose, swelling and redness of the lips and perioral area present  Neck: No abnormal masses on inspection  Resp: Normal effort, clear to auscultation bilaterally   CVS: Regular rate and rhythm, S1 S2 normal, no murmur   Bilateral breast exam: Was done in presence of medical assistant Micaela Mays. Patient was made to feel comfortable and explained the reason for the exam. Patient understands and agreed. No Nipple discharge on stimulation of both nipples.  Extremities: No edema  Neuro:  Alert and oriented, normal deep tendon reflexes  Skin: No rash  Psych: Normal mood and affect    Assessment and Plan:    1. Hyperprolactinemia (CMS-HCC)  Secondary to micro-prolactinoma. She is probably " allergic to cabergoline. We will stop cabergoline and start bromocriptine side effects and benefits of bromocriptine was discussed with patient in detail. Recent MRI does show shrinkage of the micro-prolactinoma. Appropriate prolactin levels before next visit    2. Prolactinoma (CMS-[facility])  Plan as per above    3. Nipple discharge  No nipple discharge currently. Plan is per above    4. Vitamin D deficiency  Rule out vitamin D deficiency as a contributory factor for fatigue.     5. Vitamin B12 deficiency  Rule out vitamin B12 deficiency is contributory factor for fatigue.    6. Fatigue, unspecified type  Plan as per above.    7. Adrenal insufficiency (Kevin's disease) (CMS-[facility])  Adrenal insufficiency can also lead to extreme fatigue, nausea as described by the patient. Can also lead to dizziness which the patient has been having for a long time. Rule it out.      Return in about 6 weeks (around 7/18/2017).    Thank you for allowing me to participate in the care of this patient.    Julián Waller M.D.  06/06/2017    CC:   Micaela Kelsey M.D.    This note was created using voice recognition software (Dragon). The accuracy of the dictation is limited by the abilities of the software. I have reviewed the note prior to signing, however some errors in grammar and context are still possible. If you have any questions related to this note please do not hesitate to contact our office.

## 2017-06-09 ENCOUNTER — HOSPITAL ENCOUNTER (OUTPATIENT)
Dept: LAB | Facility: MEDICAL CENTER | Age: 32
End: 2017-06-09
Attending: INTERNAL MEDICINE
Payer: COMMERCIAL

## 2017-06-09 DIAGNOSIS — E27.1 ADRENAL INSUFFICIENCY (ADDISON'S DISEASE) (HCC): ICD-10-CM

## 2017-06-09 DIAGNOSIS — R53.83 FATIGUE, UNSPECIFIED TYPE: ICD-10-CM

## 2017-06-09 DIAGNOSIS — N64.52 NIPPLE DISCHARGE: ICD-10-CM

## 2017-06-09 DIAGNOSIS — E55.9 VITAMIN D DEFICIENCY: ICD-10-CM

## 2017-06-09 DIAGNOSIS — E22.1 HYPERPROLACTINEMIA (HCC): ICD-10-CM

## 2017-06-09 DIAGNOSIS — D35.2 PROLACTINOMA (HCC): ICD-10-CM

## 2017-06-09 DIAGNOSIS — E53.8 VITAMIN B12 DEFICIENCY: ICD-10-CM

## 2017-06-09 LAB
25(OH)D3 SERPL-MCNC: 6 NG/ML (ref 30–100)
CORTIS SERPL-MCNC: 8.6 UG/DL (ref 0–23)
PROLACTIN SERPL-MCNC: 7.97 NG/ML (ref 2.8–26)
VIT B12 SERPL-MCNC: 360 PG/ML (ref 211–911)

## 2017-06-09 PROCEDURE — 82306 VITAMIN D 25 HYDROXY: CPT

## 2017-06-09 PROCEDURE — 82533 TOTAL CORTISOL: CPT

## 2017-06-09 PROCEDURE — 36415 COLL VENOUS BLD VENIPUNCTURE: CPT

## 2017-06-09 PROCEDURE — 84146 ASSAY OF PROLACTIN: CPT

## 2017-06-09 PROCEDURE — 82607 VITAMIN B-12: CPT

## 2017-06-09 PROCEDURE — 82024 ASSAY OF ACTH: CPT

## 2017-06-09 RX ORDER — ERGOCALCIFEROL 1.25 MG/1
50000 CAPSULE ORAL
Qty: 18 CAP | Refills: 0 | Status: SHIPPED | OUTPATIENT
Start: 2017-06-09 | End: 2017-07-26 | Stop reason: SDUPTHER

## 2017-06-11 LAB — ACTH PLAS-MCNC: 11 PG/ML (ref 6–58)

## 2017-07-18 ENCOUNTER — OFFICE VISIT (OUTPATIENT)
Dept: ENDOCRINOLOGY | Facility: MEDICAL CENTER | Age: 32
End: 2017-07-18
Payer: COMMERCIAL

## 2017-07-18 VITALS
OXYGEN SATURATION: 96 % | SYSTOLIC BLOOD PRESSURE: 116 MMHG | HEART RATE: 80 BPM | WEIGHT: 116 LBS | DIASTOLIC BLOOD PRESSURE: 78 MMHG | HEIGHT: 63 IN | BODY MASS INDEX: 20.55 KG/M2

## 2017-07-18 DIAGNOSIS — N64.52 NIPPLE DISCHARGE: ICD-10-CM

## 2017-07-18 DIAGNOSIS — E55.9 VITAMIN D DEFICIENCY: ICD-10-CM

## 2017-07-18 DIAGNOSIS — R53.83 FATIGUE, UNSPECIFIED TYPE: ICD-10-CM

## 2017-07-18 DIAGNOSIS — E22.1 HYPERPROLACTINEMIA (HCC): ICD-10-CM

## 2017-07-18 PROCEDURE — 99214 OFFICE O/P EST MOD 30 MIN: CPT | Performed by: INTERNAL MEDICINE

## 2017-07-18 RX ORDER — BROMOCRIPTINE MESYLATE 2.5 MG/1
2.5 TABLET ORAL DAILY
Qty: 30 TAB | Refills: 2 | Status: SHIPPED | OUTPATIENT
Start: 2017-07-18 | End: 2017-09-06 | Stop reason: SDUPTHER

## 2017-07-18 NOTE — MR AVS SNAPSHOT
"        Funmijuliocesar Joseph   2017 5:10 PM   Office Visit   MRN: [id number]    Department:  Endocrinology [facility]   Dept Phone:  [phone]    Description:  Female : 1985   Provider:  Julián Waller M.D.           Allergies as of 2017     Allergen Noted Reactions    Benadryl Allergy 2017       Nkda [No Known Drug Allergy] 2013         You were diagnosed with     Vitamin D deficiency   [2492179]       Hyperprolactinemia (CMS-HCC)   [537131]       Nipple discharge   [315183]       Fatigue, unspecified type   [5075877]         Vital Signs     Blood Pressure Pulse Height Weight Body Mass Index Oxygen Saturation    116/78 mmHg 80 1.598 m (5' 2.91\") 52.617 kg (116 lb) 20.60 kg/m2 96%    Smoking Status                   Never Smoker            Basic Information     Date Of Birth Sex Race Ethnicity Preferred Language    1985 Female  Non- English      Your appointments     Sep 06, 2017  2:50 PM   Established Patient with Julián Waller M.D.   [facility]    [id number] [place] [address], [address]  [facility] [id number] [phone]           You will be receiving a confirmation call a few days before your appointment from our automated call confirmation system.              Problem List              ICD-10-CM Priority Class Noted - Resolved    Nipple discharge N64.52   2013 - Present    Acne L70.9   2013 - Present    Vertigo R42   2013 - Present    Gastritis K29.70   2013 - Present    Hyperprolactinemia (CMS-HCC) E22.1   2013 - Present    Prolactinoma (CMS-HCC) D35.2   5/3/2017 - Present      Health Maintenance        Date Due Completion Dates    IMM DTaP/Tdap/Td Vaccine (1 - Tdap) 2005 ---    PAP SMEAR 2015 (Prv Comp)    Override on 2012: Previously completed    IMM INFLUENZA (1) 2017 ---            Current Immunizations     MMR Vaccine 2014      Below and/or attached are the medications " your provider expects you to take. Review all of your home medications and newly ordered medications with your provider and/or pharmacist. Follow medication instructions as directed by your provider and/or pharmacist. Please keep your medication list with you and share with your provider. Update the information when medications are discontinued, doses are changed, or new medications (including over-the-counter products) are added; and carry medication information at all times in the event of emergency situations     Allergies:  BENADRYL ALLERGY - (reactions not documented)     NKDA - (reactions not documented)               Medications  Valid as of: July 18, 2017 -  5:34 PM    Generic Name Brand Name Tablet Size Instructions for use    Bromocriptine Mesylate (Tab) PARLODEL 2.5 MG Take 1 Tab by mouth every day for 30 days.        Ergocalciferol (Cap) DRISDOL 85714 UNITS Take 1 Cap by mouth every 3 days for 56 days.        .                 Medicines prescribed today were sent to:     [id number] - [place], [place] - [address]    [address] [place] [address]    Phone: [phone] Fax: [phone]    Open 24 Hours?: No      Medication refill instructions:       If your prescription bottle indicates you have medication refills left, it is not necessary to call your provider’s office. Please contact your pharmacy and they will refill your medication.    If your prescription bottle indicates you do not have any refills left, you may request refills at any time through one of the following ways: The online [employer] system (except Urgent Care), by calling your provider’s office, or by asking your pharmacy to contact your provider’s office with a refill request. Medication refills are processed only during regular business hours and may not be available until the next business day. Your provider may request additional information or to have a follow-up visit with you prior to refilling your medication.      *Please Note: Medication refills are assigned a new Rx number when refilled electronically. Your pharmacy may indicate that no refills were authorized even though a new prescription for the same medication is available at the pharmacy. Please request the medicine by name with the pharmacy before contacting your provider for a refill.        Your To Do List     Future Labs/Procedures Complete By Expires    PROLACTIN  As directed 7/18/2018    VITAMIN D,25 HYDROXY  As directed 7/18/2018         MyChart Access Code: Activation code not generated  Current [employer] Status: Active

## 2017-07-19 NOTE — PROGRESS NOTES
"Endocrinology Clinic Progress Note  PCP: Micaela Kelsey M.D.    HPI:  Danni Joseph is a 32 y.o. old patient who comes in today for routine follow up for bilateral galactorrhea and high prolactin levels.  She had some sort of allergic reaction to cabergoline. This was stopped in the previous visit. Currently on bromocriptine therapy and this has helped with her bilateral galactorrhea and she has not noticed any breast discharge.    She has severe vitamin D deficiency. Her vitamin D levels are in single digits i.e. 6. She is currently on the loading dose of vitamin D. Still continues to feel tired and fatigued but she has just started taking vitamin D.    ROS:  Constitutional: Fatigue, No unintentional weight loss  Endo: Denies excessive thirst or frequent urination    Past Medical History:  Patient Active Problem List    Diagnosis Date Noted   • Prolactinoma (CMS-HCC) 05/03/2017   • Vertigo 11/05/2013   • Gastritis 11/05/2013   • Hyperprolactinemia (CMS-HCC) 11/05/2013   • Nipple discharge 07/31/2013   • Acne 07/31/2013       Medications:    Current outpatient prescriptions:   •  bromocriptine (PARLODEL) 2.5 MG Tab, Take 1 Tab by mouth every day for 30 days., Disp: 30 Tab, Rfl: 2  •  vitamin D, Ergocalciferol, (DRISDOL) 26794 UNITS Cap capsule, Take 1 Cap by mouth every 3 days for 56 days., Disp: 18 Cap, Rfl: 0    Labs:     Physical Examination:  Vital signs: /78 mmHg  Pulse 80  Ht 1.598 m (5' 2.91\")  Wt 52.617 kg (116 lb)  BMI 20.60 kg/m2  SpO2 96% Body mass index is 20.6 kg/(m^2).  General: No apparent distress, cooperative  Eyes: No scleral icterus, no discharge, normal eyelids  Neck: No abnormal masses on inspection, normal thyroid exam  Resp: Normal effort, clear to auscultation bilaterally  CVS: Regular rate and rhythm, S1 S2 normal, no murmur  Extremities: No lower extremity edema  Abdomen: noabdominal obesity present  Musculoskeletal: Normal digits and nails  Skin: No rash on visible skin  Psych: " Alert and oriented, normal mood and affect, intact memory and able to make informed decisions.    Assessment and Plan:    1. Vitamin D deficiency  Continue loading dose as prescribed. Her fatigue should improve once she completes the loading dose and her levels are at least 30 and above.    - VITAMIN D,25 HYDROXY; Future    2. Hyperprolactinemia (CMS-HCC)  Continue bromocriptine daily.  - PROLACTIN; Future    3. Nipple discharge  continue bromocriptine.  Nipple discharge is resolved.    4. Fatigue, unspecified type  Her fatigue is multifactorial. One of The major causes could be low vitamin D levels as above.  Also encouraged her to exercise regularly and some of her fatigue could also be from physical deconditioning from lack of exercise.      Return in about 2 months (around 9/18/2017).    Thank you for allowing me to participate in the care of this patient.    Julián Waller M.D.    CC:   Micaela Kelsey M.D.    This note was created using voice recognition software (Dragon). The accuracy of the dictation is limited by the abilities of the software. I have reviewed the note prior to signing, however some errors in grammar and context are still possible. If you have any questions related to this note please do not hesitate to contact our office.

## 2017-07-26 DIAGNOSIS — E55.9 VITAMIN D DEFICIENCY: ICD-10-CM

## 2017-07-26 RX ORDER — ERGOCALCIFEROL 1.25 MG/1
50000 CAPSULE ORAL
Qty: 18 CAP | Refills: 0 | Status: SHIPPED | OUTPATIENT
Start: 2017-07-26 | End: 2017-09-06 | Stop reason: SDUPTHER

## 2017-09-06 ENCOUNTER — OFFICE VISIT (OUTPATIENT)
Dept: ENDOCRINOLOGY | Facility: MEDICAL CENTER | Age: 32
End: 2017-09-06
Payer: COMMERCIAL

## 2017-09-06 VITALS
HEIGHT: 63 IN | HEART RATE: 92 BPM | OXYGEN SATURATION: 96 % | BODY MASS INDEX: 20.38 KG/M2 | WEIGHT: 115 LBS | SYSTOLIC BLOOD PRESSURE: 106 MMHG | DIASTOLIC BLOOD PRESSURE: 72 MMHG

## 2017-09-06 DIAGNOSIS — E55.9 VITAMIN D DEFICIENCY: ICD-10-CM

## 2017-09-06 DIAGNOSIS — R53.83 FATIGUE, UNSPECIFIED TYPE: ICD-10-CM

## 2017-09-06 DIAGNOSIS — E22.1 HYPERPROLACTINEMIA (HCC): ICD-10-CM

## 2017-09-06 PROCEDURE — 99214 OFFICE O/P EST MOD 30 MIN: CPT | Performed by: INTERNAL MEDICINE

## 2017-09-06 RX ORDER — ERGOCALCIFEROL 1.25 MG/1
50000 CAPSULE ORAL
Qty: 18 CAP | Refills: 0 | Status: SHIPPED | OUTPATIENT
Start: 2017-09-06 | End: 2017-11-01

## 2017-09-06 RX ORDER — BROMOCRIPTINE MESYLATE 2.5 MG/1
2.5 TABLET ORAL DAILY
Qty: 30 TAB | Refills: 5 | Status: SHIPPED | OUTPATIENT
Start: 2017-09-06 | End: 2017-10-06

## 2017-09-06 NOTE — PROGRESS NOTES
"Endocrinology Clinic Progress Note  PCP: Micaela Kelsey M.D.    HPI:  Danni Joseph is a 32 y.o. old patient who comes in today for routine follow up.   For severe vitamin D deficiency she recently completed a loading dose of 50,000 units every 3 days for about 56 days. Her energy levels are slightly better compared to before. The muscle cramps that she previously had has also disappeared.    For hyperactivity level so she is currently on bromocriptine 2.5 mg daily. She complains of runny nose quite often.    ROS:  Constitutional: Fatigue, runny nose, No unintentional weight loss  Endo: Denies excessive thirst or frequent urination  All other systems were reviewed and were negative.    Past Medical History:  Patient Active Problem List    Diagnosis Date Noted   • Prolactinoma (CMS-HCC) 05/03/2017   • Vertigo 11/05/2013   • Gastritis 11/05/2013   • Hyperprolactinemia (CMS-HCC) 11/05/2013   • Nipple discharge 07/31/2013   • Acne 07/31/2013       Medications:    Current Outpatient Prescriptions:   •  vitamin D, Ergocalciferol, (DRISDOL) 60315 units Cap capsule, Take 1 Cap by mouth every 3 days for 56 days., Disp: 18 Cap, Rfl: 0  •  bromocriptine (PARLODEL) 2.5 MG Tab, Take 1 Tab by mouth every day for 30 days., Disp: 30 Tab, Rfl: 5    Labs: Reviewed    Physical Examination:  Vital signs: /72   Pulse 92   Ht 1.6 m (5' 3\")   Wt 52.2 kg (115 lb)   SpO2 96%   BMI 20.37 kg/m²  Body mass index is 20.37 kg/m².  General: No apparent distress, cooperative  Eyes: No scleral icterus, no discharge, normal eyelids  Neck: No abnormal masses on inspection, normal thyroid exam  Resp: Normal effort, clear to auscultation bilaterally  CVS: Regular rate and rhythm, S1 S2 normal, no murmur  Extremities: No lower extremity edema, mild bruising over the right hand.  Abdomen: abdominal obesity present  Musculoskeletal: Normal digits and nails  Skin: No rash on visible skin  Psych: Alert and oriented, normal mood and affect, intact " memory and able to make informed decisions.    Assessment and Plan:    1. Vitamin D deficiency  Since her vitamin D levels with extremely low we will be repeating another loading dose as per below.  - vitamin D, Ergocalciferol, (DRISDOL) 64930 units Cap capsule; Take 1 Cap by mouth every 3 days for 56 days.   We will recheck the levels after she finishes this to ensure that the levels are coming up.    2. Fatigue, unspecified type  Fatigue may be multifactorial with low vitamin D being one of the contributory factors which is being addressed as above. Her fatigue is gradually improving.    3. Hyperprolactinemia (CMS-HCC)  Continue   bromocriptine (PARLODEL) 2.5 MG  Daily. We will repeat prolactin levels before the next visit.    Return in about 3 months (around 12/6/2017).    Thank you for allowing me to participate in the care of this patient.    Julián Waller M.D.    CC:   Micaela Kelsey M.D.    This note was created using voice recognition software (Dragon). The accuracy of the dictation is limited by the abilities of the software. I have reviewed the note prior to signing, however some errors in grammar and context are still possible. If you have any questions related to this note please do not hesitate to contact our office.

## 2017-12-01 ENCOUNTER — HOSPITAL ENCOUNTER (OUTPATIENT)
Dept: LAB | Facility: MEDICAL CENTER | Age: 32
End: 2017-12-01
Attending: FAMILY MEDICINE
Payer: COMMERCIAL

## 2017-12-01 DIAGNOSIS — E55.9 VITAMIN D DEFICIENCY: ICD-10-CM

## 2017-12-01 DIAGNOSIS — E22.1 HYPERPROLACTINEMIA (HCC): ICD-10-CM

## 2017-12-01 LAB
25(OH)D3 SERPL-MCNC: 71 NG/ML (ref 30–100)
PROLACTIN SERPL-MCNC: 17.69 NG/ML (ref 2.8–26)

## 2017-12-01 PROCEDURE — 36415 COLL VENOUS BLD VENIPUNCTURE: CPT

## 2017-12-01 PROCEDURE — 82306 VITAMIN D 25 HYDROXY: CPT

## 2017-12-01 PROCEDURE — 84146 ASSAY OF PROLACTIN: CPT

## 2017-12-06 ENCOUNTER — OFFICE VISIT (OUTPATIENT)
Dept: ENDOCRINOLOGY | Facility: MEDICAL CENTER | Age: 32
End: 2017-12-06
Payer: COMMERCIAL

## 2017-12-06 VITALS
OXYGEN SATURATION: 98 % | SYSTOLIC BLOOD PRESSURE: 104 MMHG | HEART RATE: 102 BPM | BODY MASS INDEX: 21.79 KG/M2 | RESPIRATION RATE: 12 BRPM | WEIGHT: 123 LBS | HEIGHT: 63 IN | DIASTOLIC BLOOD PRESSURE: 70 MMHG

## 2017-12-06 DIAGNOSIS — R53.83 FATIGUE, UNSPECIFIED TYPE: ICD-10-CM

## 2017-12-06 DIAGNOSIS — E22.1 HYPERPROLACTINEMIA (HCC): ICD-10-CM

## 2017-12-06 DIAGNOSIS — D35.2 PROLACTINOMA (HCC): ICD-10-CM

## 2017-12-06 DIAGNOSIS — E55.9 VITAMIN D DEFICIENCY: ICD-10-CM

## 2017-12-06 PROCEDURE — 99214 OFFICE O/P EST MOD 30 MIN: CPT | Performed by: INTERNAL MEDICINE

## 2017-12-06 RX ORDER — BROMOCRIPTINE MESYLATE 2.5 MG/1
2.5 TABLET ORAL DAILY
Qty: 90 TAB | Refills: 3 | Status: SHIPPED | OUTPATIENT
Start: 2017-12-06 | End: 2018-12-24 | Stop reason: SDUPTHER

## 2017-12-06 RX ORDER — BROMOCRIPTINE MESYLATE 2.5 MG/1
TABLET ORAL
COMMUNITY
Start: 2017-11-27 | End: 2017-12-06 | Stop reason: SDUPTHER

## 2017-12-06 NOTE — PROGRESS NOTES
"Endocrinology Clinic Progress Note  PCP: Micaela Kelsey M.D.    HPI:  Danni Joseph is a 32 y.o. old patient who comes in today for routine follow up. For hyperactivity level she is currently bromocriptine 2.5 mg daily. She denies any nipple breast discharge.  Vitamin D deficiency she has finished the loading doses that were prescribed to her. The muscle cramps and weakness that she previously had has disappeared with adequate vitamin D supplementation.    ROS:  Constitutional: No unintentional weight loss  Endo: Denies excessive thirst or frequent urination  All other systems were reviewed and were negative.    Past Medical History:  Patient Active Problem List    Diagnosis Date Noted   • Prolactinoma (CMS-HCC) 05/03/2017   • Vertigo 11/05/2013   • Gastritis 11/05/2013   • Hyperprolactinemia (CMS-HCC) 11/05/2013   • Nipple discharge 07/31/2013   • Acne 07/31/2013       Medications:    Current Outpatient Prescriptions:   •  bromocriptine (PARLODEL) 2.5 MG Tab, Take 1 Tab by mouth every day., Disp: 90 Tab, Rfl: 3    Labs: Reviewed    Physical Examination:  Vital signs: /70   Pulse (!) 102   Resp 12   Ht 1.6 m (5' 3\")   Wt 55.8 kg (123 lb)   SpO2 98%   BMI 21.79 kg/m²  Body mass index is 21.79 kg/m².  General: No apparent distress, cooperative  Eyes: No scleral icterus, no discharge, normal eyelids  Neck: No abnormal masses on inspection, normal thyroid exam  Resp: Normal effort, clear to auscultation bilaterally  CVS: Regular rate and rhythm, S1 S2 normal, no murmur  Extremities: No lower extremity edema  Abdomen: abdominal obesity present  Musculoskeletal: Normal digits and nails  Skin: No rash on visible skin  Breast exam done with medical chaperone Luppe: no evidence of discharge on inspection and on stimulation of both nipples.  Psych: Alert and oriented, normal mood and affect, intact memory and able to make informed decisions.    Assessment and Plan:    1. Fatigue, unspecified type  Vitamin D has " helped her a lot.    2. Hyperprolactinemia (CMS-HCC)  Continue- bromocriptine (PARLODEL) 2.5 MG daily.    3. Prolactinoma (CMS-HCC)  Continue bromocriptine as per above. Will repeat prolactin levels before next visit. After she has been on bromocriptine for at least 9 months to a year I will  take her off and see if  her prolactin levels remain normal.    4. Vitamin D deficiency  Continue 4812-6809 units daily as the maintenance dose.    Return in about 2 months (around 2/6/2018).    Thank you for allowing me to participate in the care of this patient.    Julián Waller M.D.    CC:   Micaela Kelsey M.D.    This note was created using voice recognition software (Dragon). The accuracy of the dictation is limited by the abilities of the software. I have reviewed the note prior to signing, however some errors in grammar and context are still possible. If you have any questions related to this note please do not hesitate to contact our office.

## 2018-01-31 ENCOUNTER — HOSPITAL ENCOUNTER (OUTPATIENT)
Dept: LAB | Facility: MEDICAL CENTER | Age: 33
End: 2018-01-31
Attending: INTERNAL MEDICINE
Payer: COMMERCIAL

## 2018-01-31 DIAGNOSIS — R53.83 FATIGUE, UNSPECIFIED TYPE: ICD-10-CM

## 2018-01-31 DIAGNOSIS — E22.1 HYPERPROLACTINEMIA (HCC): ICD-10-CM

## 2018-01-31 DIAGNOSIS — E55.9 VITAMIN D DEFICIENCY: ICD-10-CM

## 2018-01-31 DIAGNOSIS — D35.2 PROLACTINOMA (HCC): ICD-10-CM

## 2018-01-31 LAB
25(OH)D3 SERPL-MCNC: 48 NG/ML (ref 30–100)
PROLACTIN SERPL-MCNC: 13.02 NG/ML (ref 2.8–26)

## 2018-01-31 PROCEDURE — 36415 COLL VENOUS BLD VENIPUNCTURE: CPT

## 2018-01-31 PROCEDURE — 82306 VITAMIN D 25 HYDROXY: CPT

## 2018-01-31 PROCEDURE — 84146 ASSAY OF PROLACTIN: CPT

## 2018-02-07 ENCOUNTER — OFFICE VISIT (OUTPATIENT)
Dept: ENDOCRINOLOGY | Facility: MEDICAL CENTER | Age: 33
End: 2018-02-07
Payer: COMMERCIAL

## 2018-02-07 VITALS
BODY MASS INDEX: 21.97 KG/M2 | OXYGEN SATURATION: 98 % | HEART RATE: 75 BPM | HEIGHT: 63 IN | SYSTOLIC BLOOD PRESSURE: 110 MMHG | WEIGHT: 124 LBS | DIASTOLIC BLOOD PRESSURE: 70 MMHG

## 2018-02-07 DIAGNOSIS — R53.83 FATIGUE, UNSPECIFIED TYPE: ICD-10-CM

## 2018-02-07 DIAGNOSIS — E55.9 VITAMIN D DEFICIENCY: ICD-10-CM

## 2018-02-07 DIAGNOSIS — E28.2 PCOS (POLYCYSTIC OVARIAN SYNDROME): ICD-10-CM

## 2018-02-07 DIAGNOSIS — E22.1 HYPERPROLACTINEMIA (HCC): ICD-10-CM

## 2018-02-07 DIAGNOSIS — D35.2 PROLACTINOMA (HCC): ICD-10-CM

## 2018-02-07 PROCEDURE — 99214 OFFICE O/P EST MOD 30 MIN: CPT | Performed by: INTERNAL MEDICINE

## 2018-02-07 RX ORDER — SPIRONOLACTONE 50 MG/1
50 TABLET, FILM COATED ORAL DAILY
Qty: 30 TAB | Refills: 3 | Status: SHIPPED | OUTPATIENT
Start: 2018-02-07 | End: 2018-05-21

## 2018-02-07 NOTE — PROGRESS NOTES
"Endocrinology Clinic Progress Note  PCP: Micaela Kelsey M.D.    HPI:  Danni Joseph is a 33 y.o. old patient who comes in today for review of endocrine problems.  High Prolactin levels. Currently on bromocriptine 2.5 mg daily. Her most recent prolactin levels are normal. She sometimes misses her doses of bromocriptine on one or 2 days of the week.  Vitamin D deficiency: She is currently at 5000 units of vitamin D daily.    She is complaining of increased nodular acne on the face and also  On the nape of the neck and the scalp region.    ROS:  Constitutional: nodular acne, No unintentional weight loss  Endo: Denies excessive thirst or frequent urination  All other systems were reviewed and were negative.    Past Medical History:  Patient Active Problem List    Diagnosis Date Noted   • Prolactinoma (CMS-HCC) 05/03/2017   • Vertigo 11/05/2013   • Gastritis 11/05/2013   • Hyperprolactinemia (CMS-HCC) 11/05/2013   • Nipple discharge 07/31/2013   • Acne 07/31/2013       Medications:    Current Outpatient Prescriptions:   •  spironolactone (ALDACTONE) 50 MG Tab, Take 1 Tab by mouth every day., Disp: 30 Tab, Rfl: 3  •  bromocriptine (PARLODEL) 2.5 MG Tab, Take 1 Tab by mouth every day., Disp: 90 Tab, Rfl: 3    Labs: Reviewed    Physical Examination:  Vital signs: /70   Pulse 75   Ht 1.6 m (5' 2.99\")   Wt 56.2 kg (124 lb)   SpO2 98%   BMI 21.97 kg/m²  Body mass index is 21.97 kg/m².  General: No apparent distress, cooperative  Eyes: No scleral icterus, no discharge, normal eyelids  Neck: No abnormal masses on inspection, normal thyroid exam  Resp: Normal effort, clear to auscultation bilaterally  CVS: Regular rate and rhythm, S1 S2 normal, no murmur  Extremities: No lower extremity edema  Abdomen: abdominal obesity present  Musculoskeletal: Normal digits and nails  Skin: No rash on visible skin  Psych: Alert and oriented, normal mood and affect, intact memory and able to make informed decisions.    Assessment and " Plan:    1. Fatigue, unspecified type  Improved with treatment of underlying issues as per below.    2. Hyperprolactinemia (CMS-HCC)  Controlled.    3. Prolactinoma (CMS-HCC)  Stable.     4. Vitamin D deficiency  Increase vit D to 10,000 units daily.     5. PCOS (polycystic ovarian syndrome)  Start   - spironolactone (ALDACTONE) 50 MG Tab; Take 1 Tab by mouth every day.  This is likely to help with  Her nodular acne.     Return in about 3 months (around 5/7/2018).    Thank you for allowing me to participate in the care of this patient.    Julián Waller M.D.    CC:   Micaela Kelsey M.D.    This note was created using voice recognition software (Dragon). The accuracy of the dictation is limited by the abilities of the software. I have reviewed the note prior to signing, however some errors in grammar and context are still possible. If you have any questions related to this note please do not hesitate to contact our office.

## 2018-05-08 ENCOUNTER — HOSPITAL ENCOUNTER (OUTPATIENT)
Dept: LAB | Facility: MEDICAL CENTER | Age: 33
End: 2018-05-08
Attending: INTERNAL MEDICINE
Payer: COMMERCIAL

## 2018-05-08 DIAGNOSIS — E22.1 HYPERPROLACTINEMIA (HCC): ICD-10-CM

## 2018-05-08 DIAGNOSIS — D35.2 PROLACTINOMA (HCC): ICD-10-CM

## 2018-05-08 DIAGNOSIS — R53.83 FATIGUE, UNSPECIFIED TYPE: ICD-10-CM

## 2018-05-08 DIAGNOSIS — E55.9 VITAMIN D DEFICIENCY: ICD-10-CM

## 2018-05-08 DIAGNOSIS — E28.2 PCOS (POLYCYSTIC OVARIAN SYNDROME): ICD-10-CM

## 2018-05-08 LAB — PROLACTIN SERPL-MCNC: 12.27 NG/ML (ref 2.8–26)

## 2018-05-08 PROCEDURE — 36415 COLL VENOUS BLD VENIPUNCTURE: CPT

## 2018-05-08 PROCEDURE — 84146 ASSAY OF PROLACTIN: CPT

## 2018-05-08 PROCEDURE — 80048 BASIC METABOLIC PNL TOTAL CA: CPT

## 2018-05-09 LAB
ANION GAP SERPL CALC-SCNC: 7 MMOL/L (ref 0–11.9)
BUN SERPL-MCNC: 7 MG/DL (ref 8–22)
CALCIUM SERPL-MCNC: 9.8 MG/DL (ref 8.5–10.5)
CHLORIDE SERPL-SCNC: 104 MMOL/L (ref 96–112)
CO2 SERPL-SCNC: 27 MMOL/L (ref 20–33)
CREAT SERPL-MCNC: 0.51 MG/DL (ref 0.5–1.4)
GLUCOSE SERPL-MCNC: 81 MG/DL (ref 65–99)
POTASSIUM SERPL-SCNC: 3.9 MMOL/L (ref 3.6–5.5)
SODIUM SERPL-SCNC: 138 MMOL/L (ref 135–145)

## 2018-05-21 ENCOUNTER — OFFICE VISIT (OUTPATIENT)
Dept: ENDOCRINOLOGY | Facility: MEDICAL CENTER | Age: 33
End: 2018-05-21
Payer: COMMERCIAL

## 2018-05-21 VITALS
WEIGHT: 123 LBS | SYSTOLIC BLOOD PRESSURE: 96 MMHG | BODY MASS INDEX: 21.79 KG/M2 | DIASTOLIC BLOOD PRESSURE: 60 MMHG | HEART RATE: 95 BPM | OXYGEN SATURATION: 96 % | HEIGHT: 63 IN

## 2018-05-21 DIAGNOSIS — L70.0 NODULAR ACNE: ICD-10-CM

## 2018-05-21 DIAGNOSIS — E53.8 VITAMIN B12 DEFICIENCY: ICD-10-CM

## 2018-05-21 DIAGNOSIS — E55.9 VITAMIN D DEFICIENCY: ICD-10-CM

## 2018-05-21 DIAGNOSIS — E22.1 HYPERPROLACTINEMIA (HCC): ICD-10-CM

## 2018-05-21 DIAGNOSIS — E28.2 PCOS (POLYCYSTIC OVARIAN SYNDROME): ICD-10-CM

## 2018-05-21 PROCEDURE — 99214 OFFICE O/P EST MOD 30 MIN: CPT | Performed by: INTERNAL MEDICINE

## 2018-05-21 RX ORDER — ERGOCALCIFEROL 1.25 MG/1
50000 CAPSULE ORAL
Qty: 12 CAP | Refills: 3 | Status: SHIPPED | OUTPATIENT
Start: 2018-05-21 | End: 2019-04-30 | Stop reason: SDUPTHER

## 2018-05-21 RX ORDER — MAGNESIUM 200 MG
1000 TABLET ORAL DAILY
Qty: 30 TAB | Refills: 3 | Status: SHIPPED | OUTPATIENT
Start: 2018-05-21

## 2018-05-21 RX ORDER — M-VIT,TX,IRON,MINS/CALC/FOLIC 27MG-0.4MG
1 TABLET ORAL DAILY
COMMUNITY
End: 2019-01-29

## 2018-05-21 NOTE — PROGRESS NOTES
"Endocrinology Clinic Progress Note  PCP: Micaela Kelsey M.D.    HPI:  Danni Joseph is a 33 y.o. old patient who comes in today for review of endocrine problems.  States does not have good energy.  High prolactin levels:  Prolactin level on 5/8/18 was 12.27. Currently on bromocriptine daily.   Vitamin D Deficiency:  Currently taking 2,000 to 3,000 units daily. Stopped taking 50,000 units once a week.  PCOS with nodular acne:  Started on Spironolactone 50 mg daily but stopped because it wasn't helping.  Vitamin B12 def: stopped taking it as it was too many pills.    ROS:  Constitutional: fatigue, No unintentional weight loss  Endo: Denies excessive thirst or frequent urination  All other systems were reviewed and were negative.    Past Medical History:  Patient Active Problem List    Diagnosis Date Noted   • Prolactinoma (HCC) 05/03/2017   • Vertigo 11/05/2013   • Gastritis 11/05/2013   • Hyperprolactinemia (HCC) 11/05/2013   • Nipple discharge 07/31/2013   • Acne 07/31/2013       Medications:    Current Outpatient Prescriptions:   •  Cholecalciferol (VITAMIN D HIGH POTENCY PO), Take 2,000 Units by mouth every day., Disp: , Rfl:   •  therapeutic multivitamin-minerals (THERAGRAN-M) Tab, Take 1 Tab by mouth every day., Disp: , Rfl:   •  vitamin D, Ergocalciferol, (DRISDOL) 36985 units Cap capsule, Take 1 Cap by mouth every 7 days., Disp: 12 Cap, Rfl: 3  •  Cyanocobalamin (VITAMIN B-12) 1000 MCG SL Tab, Place 1 Tab under tongue every day., Disp: 30 Tab, Rfl: 3  •  bromocriptine (PARLODEL) 2.5 MG Tab, Take 1 Tab by mouth every day., Disp: 90 Tab, Rfl: 3    Labs: Reviewed    Physical Examination:  Vital signs: BP (!) 96/60   Pulse 95   Ht 1.6 m (5' 3\")   Wt 55.8 kg (123 lb)   SpO2 96%   BMI 21.79 kg/m²  Body mass index is 21.79 kg/m². Patient's body mass index is 21.79 kg/m². Exercise and nutrition counseling were performed at this visit.   General: No apparent distress, cooperative  Eyes: No scleral icterus, no " discharge, normal eyelids  Neck: No abnormal masses on inspection, normal thyroid exam  Resp: Normal effort, clear to auscultation bilaterally  CVS: Regular rate and rhythm, S1 S2 normal, no murmur  Extremities: No lower extremity edema  Abdomen: abdominal obesity present  Musculoskeletal: Normal digits and nails  Skin: No rash on visible skin  Psych: Alert and oriented, normal mood and affect, intact memory and able to make informed decisions.    Assessment and Plan:    1. Hyperprolactinemia (HCC)  Stable prolactin levels on bromocriptine therapy. Plan to continue bromocriptine untill December 2018 and if the prolactin levels continue to remain normal then we will stop it.    2. Vitamin D deficiency   restart 50,000 units of vit D once a week as fatigue could be low vit D levels.    3. Nodular acne  Stable.     4. PCOS (polycystic ovarian syndrome)  Stable.     5. Vit B12 def: advised to restart B12 sublingual 0442-1864 mcg daily as fatigue could be from low B12 levels.     Return in about 6 months (around 11/21/2018).    Total face to face time spent with patient equals 40 minutes. 25/40 minutes were spent on counseling the patient about the pathophysiology of pituitary-thyroid axis, pituitary-adrenal axis, pituitary-gonadal axis, natural history of thyroid nodules, side effects and benefits of thyroid hormone, testosterone, Vit D and Vit B12 replacement.    Thank you for allowing me to participate in the care of this patient.    Julián Waller M.D.    CC:   Micaela Kelsey M.D.    This note was created using voice recognition software (Dragon). The accuracy of the dictation is limited by the abilities of the software. I have reviewed the note prior to signing, however some errors in grammar and context are still possible. If you have any questions related to this note please do not hesitate to contact our office.

## 2018-08-02 ENCOUNTER — OFFICE VISIT (OUTPATIENT)
Dept: MEDICAL GROUP | Facility: PHYSICIAN GROUP | Age: 33
End: 2018-08-02
Payer: COMMERCIAL

## 2018-08-02 VITALS
HEART RATE: 80 BPM | DIASTOLIC BLOOD PRESSURE: 72 MMHG | HEIGHT: 63 IN | WEIGHT: 122.4 LBS | OXYGEN SATURATION: 98 % | RESPIRATION RATE: 14 BRPM | BODY MASS INDEX: 21.69 KG/M2 | SYSTOLIC BLOOD PRESSURE: 114 MMHG | TEMPERATURE: 97.9 F

## 2018-08-02 DIAGNOSIS — Z00.00 WELL ADULT EXAM: ICD-10-CM

## 2018-08-02 DIAGNOSIS — J30.1 ALLERGIC RHINITIS DUE TO POLLEN, UNSPECIFIED SEASONALITY: ICD-10-CM

## 2018-08-02 PROCEDURE — 99395 PREV VISIT EST AGE 18-39: CPT | Performed by: FAMILY MEDICINE

## 2018-08-02 RX ORDER — FLUTICASONE PROPIONATE 50 MCG
2 SPRAY, SUSPENSION (ML) NASAL DAILY
Qty: 16 G | Refills: 11 | Status: SHIPPED | OUTPATIENT
Start: 2018-08-02

## 2018-08-02 ASSESSMENT — PATIENT HEALTH QUESTIONNAIRE - PHQ9: CLINICAL INTERPRETATION OF PHQ2 SCORE: 0

## 2018-08-02 NOTE — PROGRESS NOTES
Chief Complaint   Patient presents with   • Annual Exam       HISTORY OF PRESENT ILLNESS: Patient is a 33 y.o. female established patient here today for the following concerns:    1. Well adult exam  2. Allergic rhinitis due to pollen, unspecified seasonality  Here today for annual wellness, declines pap today.  She reports that she has been doing well other than some sneezing and itchy water eyes.  She has used antihistamines without much help.  No fevers, chills.  She has also noted a dark spot over the abdomen.  No pain associated with it and no known bruising there.        Past Medical, Social, and Family history reviewed and updated in EPIC    Allergies:Nkda [no known drug allergy]    Current Outpatient Prescriptions   Medication Sig Dispense Refill   • fluticasone (FLONASE) 50 MCG/ACT nasal spray Spray 2 Sprays in nose every day. Each Nostril 16 g 11   • vitamin D, Ergocalciferol, (DRISDOL) 18912 units Cap capsule Take 1 Cap by mouth every 7 days. 12 Cap 3   • Cyanocobalamin (VITAMIN B-12) 1000 MCG SL Tab Place 1 Tab under tongue every day. 30 Tab 3   • bromocriptine (PARLODEL) 2.5 MG Tab Take 1 Tab by mouth every day. 90 Tab 3   • Cholecalciferol (VITAMIN D HIGH POTENCY PO) Take 2,000 Units by mouth every day.     • therapeutic multivitamin-minerals (THERAGRAN-M) Tab Take 1 Tab by mouth every day.       No current facility-administered medications for this visit.          ROS:  Review of Systems   Constitutional: Negative for fever, chills, weight loss and malaise/fatigue.   HENT: Negative for ear pain, nosebleeds, congestion, sore throat and neck pain.    Eyes: Negative for blurred vision.   Respiratory: Negative for cough, sputum production, shortness of breath and wheezing.    Cardiovascular: Negative for chest pain, palpitations,  and leg swelling.   Gastrointestinal: Negative for heartburn, nausea, vomiting, diarrhea and abdominal pain.   Genitourinary: Negative for dysuria, urgency and frequency.  "  Musculoskeletal: Negative for myalgias, back pain and joint pain.   Skin: Negative for rash and itching.   Neurological: Negative for dizziness, tingling, tremors, sensory change, focal weakness and headaches.   Endo/Heme/Allergies: Does not bruise/bleed easily.   Psychiatric/Behavioral: Negative for depression, anxiety, suicidal ideas, insomnia and memory loss.      Exam:  Blood pressure 114/72, pulse 80, temperature 36.6 °C (97.9 °F), resp. rate 14, height 1.6 m (5' 3\"), weight 55.5 kg (122 lb 6.4 oz), SpO2 98 %.    General:  Well nourished, well developed in NAD  Head is grossly normal.  Neck: Supple without JVD   Pulmonary:  Normal effort.   Cardiovascular: Regular rate  Extremities: no clubbing, cyanosis, or edema.  Psych: affect appropriate  Abdomen: positive bowel sounds.  Non tender, non distended, no hepatosplenomegaly.  Ovoid pigmented area on the abdomen, flat, non-blanching appears to be chronic hemosiderin deposition or hyperpigmentation.        Please note that this dictation was created using voice recognition software. I have made every reasonable attempt to correct obvious errors, but I expect that there are errors of grammar and possibly content that I did not discover before finalizing the note.    Assessment/Plan:  1. Well adult exam  Continue healthy lifestyle  Advised TDAP and PAP, declined today but will update in the next several months.     2. Allergic rhinitis due to pollen, unspecified seasonality  - fluticasone (FLONASE) 50 MCG/ACT nasal spray; Spray 2 Sprays in nose every day. Each Nostril  Dispense: 16 g; Refill: 11            "

## 2018-11-27 ENCOUNTER — HOSPITAL ENCOUNTER (OUTPATIENT)
Dept: LAB | Facility: MEDICAL CENTER | Age: 33
End: 2018-11-27
Attending: INTERNAL MEDICINE
Payer: COMMERCIAL

## 2018-11-27 DIAGNOSIS — E53.8 VITAMIN B12 DEFICIENCY: ICD-10-CM

## 2018-11-27 DIAGNOSIS — E55.9 VITAMIN D DEFICIENCY: ICD-10-CM

## 2018-11-27 DIAGNOSIS — E22.1 HYPERPROLACTINEMIA (HCC): ICD-10-CM

## 2018-11-27 PROCEDURE — 82607 VITAMIN B-12: CPT

## 2018-11-27 PROCEDURE — 84146 ASSAY OF PROLACTIN: CPT

## 2018-11-27 PROCEDURE — 82306 VITAMIN D 25 HYDROXY: CPT

## 2018-11-27 PROCEDURE — 36415 COLL VENOUS BLD VENIPUNCTURE: CPT

## 2018-11-28 LAB
25(OH)D3 SERPL-MCNC: 76 NG/ML (ref 30–100)
PROLACTIN SERPL-MCNC: 14.81 NG/ML (ref 2.8–26)
VIT B12 SERPL-MCNC: 850 PG/ML (ref 211–911)

## 2018-12-24 DIAGNOSIS — E22.1 HYPERPROLACTINEMIA (HCC): ICD-10-CM

## 2018-12-24 RX ORDER — BROMOCRIPTINE MESYLATE 2.5 MG/1
2.5 TABLET ORAL DAILY
Qty: 90 TAB | Refills: 3 | Status: SHIPPED | OUTPATIENT
Start: 2018-12-24 | End: 2020-01-06

## 2019-01-29 ENCOUNTER — OFFICE VISIT (OUTPATIENT)
Dept: ENDOCRINOLOGY | Facility: MEDICAL CENTER | Age: 34
End: 2019-01-29
Payer: COMMERCIAL

## 2019-01-29 VITALS
HEIGHT: 63 IN | BODY MASS INDEX: 21.26 KG/M2 | HEART RATE: 93 BPM | DIASTOLIC BLOOD PRESSURE: 60 MMHG | WEIGHT: 120 LBS | SYSTOLIC BLOOD PRESSURE: 100 MMHG | OXYGEN SATURATION: 98 %

## 2019-01-29 DIAGNOSIS — E55.9 VITAMIN D DEFICIENCY: ICD-10-CM

## 2019-01-29 DIAGNOSIS — E53.8 VITAMIN B12 DEFICIENCY: ICD-10-CM

## 2019-01-29 DIAGNOSIS — E22.1 HYPERPROLACTINEMIA (HCC): ICD-10-CM

## 2019-01-29 PROCEDURE — 99214 OFFICE O/P EST MOD 30 MIN: CPT | Performed by: INTERNAL MEDICINE

## 2019-01-29 NOTE — PROGRESS NOTES
"Endocrinology Clinic Progress Note  PCP: Micaela Kelsey M.D.    HPI:  Danni Joseph is a 34 y.o. old patient who comes in today for review of endocrine problems.    Hyperprolactinemia:  Currently taking Bromocriptine 2.5 mg daily.      Vitamin D Deficiency:  Vitamin D 50,000 units weekly.  Forgets to take this often.       Vitamin B12 Deficiency:  Vitamin B 12 1000 mcg daily when she remembers.      ROS:  Constitutional: No unintentional weight loss  Endo: Denies excessive thirst or frequent urination  All other systems were reviewed and were negative.    Past Medical History:  Patient Active Problem List    Diagnosis Date Noted   • Prolactinoma (HCC) 05/03/2017   • Vertigo 11/05/2013   • Gastritis 11/05/2013   • Hyperprolactinemia (HCC) 11/05/2013   • Nipple discharge 07/31/2013   • Acne 07/31/2013       Medications:    Current Outpatient Prescriptions:   •  bromocriptine (PARLODEL) 2.5 MG Tab, Take 1 Tab by mouth every day., Disp: 90 Tab, Rfl: 3  •  vitamin D, Ergocalciferol, (DRISDOL) 60444 units Cap capsule, Take 1 Cap by mouth every 7 days., Disp: 12 Cap, Rfl: 3  •  Cyanocobalamin (VITAMIN B-12) 1000 MCG SL Tab, Place 1 Tab under tongue every day., Disp: 30 Tab, Rfl: 3  •  fluticasone (FLONASE) 50 MCG/ACT nasal spray, Spray 2 Sprays in nose every day. Each Nostril, Disp: 16 g, Rfl: 11    Labs: Reviewed    Physical Examination:  Vital signs: /60   Pulse 93   Ht 1.6 m (5' 3\")   Wt 54.4 kg (120 lb)   SpO2 98%   BMI 21.26 kg/m²  Body mass index is 21.26 kg/m². Patient's body mass index is 21.26 kg/m². Exercise and nutrition counseling were performed at this visit.  She is running 3 times/week.   General: No apparent distress, cooperative,thin  Eyes: No scleral icterus, no discharge, normal eyelids  Neck: No abnormal masses on inspection, normal thyroid exam  Resp: Normal effort, clear to auscultation bilaterally  CVS: Regular rate and rhythm, S1 S2 normal, no murmur  Extremities: No lower extremity " edema  Abdomen: no abdominal obesity present  Musculoskeletal: Normal digits and nails  Skin: No rash on visible skin  Psych: Alert and oriented, normal mood and affect, intact memory and able to make informed decisions.    Assessment and Plan:    1. Hyperprolactinemia (HCC)  Prolactin levels are in good range.  She denies any breast discharge.  She has been on bromocriptine for at least a year now.  We will stop the bromocriptine and see what happens.    2. Vitamin D deficiency  Continue vitamin D supplementation    3. Vitamin B12 deficiency  Continue vitamin B12 supplementation    Return in about 3 months (around 4/29/2019).    Thank you for allowing me to participate in the care of this patient.    Julián Waller M.D.    CC:   Micaela Kelsey M.D.    This note was created using voice recognition software (Dragon). The accuracy of the dictation is limited by the abilities of the software. I have reviewed the note prior to signing, however some errors in grammar and context are still possible. If you have any questions related to this note please do not hesitate to contact our office.

## 2019-04-25 ENCOUNTER — HOSPITAL ENCOUNTER (OUTPATIENT)
Dept: LAB | Facility: MEDICAL CENTER | Age: 34
End: 2019-04-25
Attending: INTERNAL MEDICINE
Payer: COMMERCIAL

## 2019-04-25 DIAGNOSIS — E22.1 HYPERPROLACTINEMIA (HCC): ICD-10-CM

## 2019-04-25 DIAGNOSIS — E55.9 VITAMIN D DEFICIENCY: ICD-10-CM

## 2019-04-25 DIAGNOSIS — E53.8 VITAMIN B12 DEFICIENCY: ICD-10-CM

## 2019-04-25 LAB
25(OH)D3 SERPL-MCNC: 78 NG/ML (ref 30–100)
PROLACTIN SERPL-MCNC: 32.08 NG/ML (ref 2.8–26)
VIT B12 SERPL-MCNC: 1091 PG/ML (ref 211–911)

## 2019-04-25 PROCEDURE — 84146 ASSAY OF PROLACTIN: CPT

## 2019-04-25 PROCEDURE — 82607 VITAMIN B-12: CPT

## 2019-04-25 PROCEDURE — 82306 VITAMIN D 25 HYDROXY: CPT

## 2019-04-25 PROCEDURE — 36415 COLL VENOUS BLD VENIPUNCTURE: CPT

## 2019-04-30 ENCOUNTER — OFFICE VISIT (OUTPATIENT)
Dept: ENDOCRINOLOGY | Facility: MEDICAL CENTER | Age: 34
End: 2019-04-30
Payer: COMMERCIAL

## 2019-04-30 VITALS
WEIGHT: 120 LBS | HEIGHT: 63 IN | BODY MASS INDEX: 21.26 KG/M2 | DIASTOLIC BLOOD PRESSURE: 70 MMHG | OXYGEN SATURATION: 98 % | SYSTOLIC BLOOD PRESSURE: 106 MMHG | HEART RATE: 82 BPM

## 2019-04-30 DIAGNOSIS — E55.9 VITAMIN D DEFICIENCY: ICD-10-CM

## 2019-04-30 DIAGNOSIS — E53.8 VITAMIN B12 DEFICIENCY: ICD-10-CM

## 2019-04-30 DIAGNOSIS — E22.1 HYPERPROLACTINEMIA (HCC): ICD-10-CM

## 2019-04-30 PROCEDURE — 99214 OFFICE O/P EST MOD 30 MIN: CPT | Performed by: INTERNAL MEDICINE

## 2019-04-30 RX ORDER — ERGOCALCIFEROL 1.25 MG/1
50000 CAPSULE ORAL
Qty: 6 CAP | Refills: 1 | Status: SHIPPED | OUTPATIENT
Start: 2019-04-30

## 2019-04-30 NOTE — PROGRESS NOTES
"Endocrinology Clinic Progress Note  PCP: Micaela Kelsey M.D.    HPI:  Danni Joseph is a 34 y.o. old patient who comes in today for review of endocrine problems.    Hyperprolactinemia:  Stopped taking Bromocriptine 2.5 mg daily on 1/29/19.  Lab on 4/25/19 shows prolactin of 32.08.      Vitamin D Deficiency:  Vitamin D 50,000 units weekly.         Vitamin B12 Deficiency:  Vitamin B 12 1000 mcg daily.     ROS:  Constitutional: No unintentional weight loss  Skin: Increased acne on face, upper chest and back  Endo: Denies excessive thirst or frequent urination  All other systems were reviewed and were negative.    Past Medical History:  Patient Active Problem List    Diagnosis Date Noted   • Prolactinoma (HCC) 05/03/2017   • Vertigo 11/05/2013   • Gastritis 11/05/2013   • Hyperprolactinemia (HCC) 11/05/2013   • Nipple discharge 07/31/2013   • Acne 07/31/2013       Medications:    Current Outpatient Prescriptions:   •  vitamin D, Ergocalciferol, (DRISDOL) 42267 units Cap capsule, Take 1 Cap by mouth every 14 days., Disp: 6 Cap, Rfl: 1  •  Cyanocobalamin (VITAMIN B-12) 1000 MCG SL Tab, Place 1 Tab under tongue every day., Disp: 30 Tab, Rfl: 3  •  bromocriptine (PARLODEL) 2.5 MG Tab, Take 1 Tab by mouth every day. (Patient not taking: Reported on 4/30/2019), Disp: 90 Tab, Rfl: 3  •  fluticasone (FLONASE) 50 MCG/ACT nasal spray, Spray 2 Sprays in nose every day. Each Nostril, Disp: 16 g, Rfl: 11    Labs: Reviewed    Physical Examination:  Vital signs: /70   Pulse 82   Ht 1.6 m (5' 3\")   Wt 54.4 kg (120 lb)   SpO2 98%   BMI 21.26 kg/m²  Body mass index is 21.26 kg/m². Patient's body mass index is 21.26 kg/m². Exercise and nutrition counseling were performed at this visit.  She is a runner.  General: No apparent distress, cooperative,thin  Eyes: No scleral icterus, no discharge, normal eyelids  Neck: No abnormal masses on inspection, normal thyroid exam  Resp: Normal effort, clear to auscultation bilaterally  CVS: " Regular rate and rhythm, S1 S2 normal, no murmur  Extremities: No lower extremity edema  Abdomen: no abdominal obesity present  Musculoskeletal: Normal digits and nails  Skin: No rash on visible skin,acne on face   Psych: Alert and oriented, normal mood and affect, intact memory and able to make informed decisions.    Assessment and Plan:    1. Hyperprolactinemia   Advised to resume bromocriptine 2.5 mg again however she would prefer to take it every alternate days instead of daily.  She can try taking it alternate days to see if that would keep the prolactin levels in the normal range    2. Vitamin D deficiency  Now take vitamin D 50,000 units every 2 weeks    Return in about 4 months (around 8/30/2019).    Thank you for allowing me to participate in the care of this patient.    Julián Waller  This note was scribed by Terrie Triplett RN CDE  CC:   Micaela Kelsey M.D.    This note was created using voice recognition software (Dragon). The accuracy of the dictation is limited by the abilities of the software. I have reviewed the note prior to signing, however some errors in grammar and context are still possible. If you have any questions related to this note please do not hesitate to contact our office.

## 2019-06-14 ENCOUNTER — OFFICE VISIT (OUTPATIENT)
Dept: URGENT CARE | Facility: PHYSICIAN GROUP | Age: 34
End: 2019-06-14
Payer: COMMERCIAL

## 2019-06-14 VITALS
SYSTOLIC BLOOD PRESSURE: 102 MMHG | WEIGHT: 116 LBS | BODY MASS INDEX: 20.55 KG/M2 | HEART RATE: 76 BPM | TEMPERATURE: 98.4 F | OXYGEN SATURATION: 94 % | RESPIRATION RATE: 16 BRPM | DIASTOLIC BLOOD PRESSURE: 60 MMHG | HEIGHT: 63 IN

## 2019-06-14 DIAGNOSIS — R22.0 SWELLING OF UPPER LIP: ICD-10-CM

## 2019-06-14 PROCEDURE — 99214 OFFICE O/P EST MOD 30 MIN: CPT | Performed by: NURSE PRACTITIONER

## 2019-06-14 NOTE — PROGRESS NOTES
"Subjective:      Danni Joseph is a 34 y.o. female who presents with Oral Swelling (woke up that way this morning)            This is a new problem. Pt reports she woke this morning with swelling to her upper lip. She reports there is also a sensation of tingling in the area. She admits this has happened once before, last month, but she does not know what caused it. Now it has happened again and still she is unsure of the cause. Denies any known allergies to any food products or medicines. She has not had any recent changes to her meds. She has not taken any medications today for this problem. She did apply some ice to the area which helped a little. She is able to talk in full sentences and denies any trouble breathing or swallowing.        Review of Systems   HENT:        Upper lip swelling   All other systems reviewed and are negative.    Past Medical History:   Diagnosis Date   • Acne 7/31/2013   • Nipple discharge 7/31/2013      Past Surgical History:   Procedure Laterality Date   • EYE SURGERY      LASIC      Social History     Social History   • Marital status: Single     Spouse name: N/A   • Number of children: N/A   • Years of education: N/A     Occupational History   •  [employer]       AT [employer]      Social History Main Topics   • Smoking status: Never Smoker   • Smokeless tobacco: Never Used   • Alcohol use No   • Drug use: No   • Sexual activity: Not Currently     Other Topics Concern   • Not on file     Social History Narrative   • No narrative on file          Objective:     /60   Pulse 76   Temp 36.9 °C (98.4 °F)   Resp 16   Ht 1.6 m (5' 3\")   Wt 52.6 kg (116 lb)   SpO2 94%   BMI 20.55 kg/m²      Physical Exam   Constitutional: She is oriented to person, place, and time. Vital signs are normal. She appears well-developed and well-nourished.   HENT:   Head: Normocephalic and atraumatic.   Mouth/Throat: No posterior oropharyngeal edema.       Eyes: Pupils are equal, round, and reactive to light. " EOM are normal.   Neck: Normal range of motion.   Cardiovascular: Normal rate and regular rhythm.    Pulmonary/Chest: Effort normal.   Musculoskeletal: Normal range of motion.   Neurological: She is alert and oriented to person, place, and time.   Skin: Skin is warm and dry. Capillary refill takes less than 2 seconds.   Psychiatric: She has a normal mood and affect. Her speech is normal and behavior is normal. Thought content normal.   Vitals reviewed.              Assessment/Plan:     1. Swelling of upper lip    Discussed with patient etiology of swelling is unclear at this time  Encouraged her to use OTC benadryl as directed to help reduce swelling  Also take OTC claritin daily for one week  May use ice compresses to help reduce swelling  Strict ER precautions for any trouble swallowing or difficulty breathing  Please follow up with PCP or UC if this occurs again, may need to be referred to allergist  Supportive care, differential diagnoses, and indications for immediate follow-up discussed with patient.    Pathogenesis of diagnosis discussed including typical length and natural progression.      Instructed to return to  or nearest emergency department if symptoms fail to improve, for any change in condition, further concerns, or new concerning symptoms.  Patient states understanding of the plan of care and discharge instructions.

## 2019-06-14 NOTE — LETTER
June 14, 2019         Patient: Danni Joseph   YOB: 1985   Date of Visit: 6/14/2019           To Whom it May Concern:    Danni Joseph was seen in my clinic on 6/14/2019. Please excuse her from work today.        Sincerely,           KHADRA Rosales.  Electronically Signed

## 2019-06-26 ENCOUNTER — HOSPITAL ENCOUNTER (OUTPATIENT)
Facility: MEDICAL CENTER | Age: 34
End: 2019-06-26
Attending: FAMILY MEDICINE
Payer: COMMERCIAL

## 2019-06-26 ENCOUNTER — OFFICE VISIT (OUTPATIENT)
Dept: MEDICAL GROUP | Facility: PHYSICIAN GROUP | Age: 34
End: 2019-06-26
Payer: COMMERCIAL

## 2019-06-26 VITALS
RESPIRATION RATE: 14 BRPM | BODY MASS INDEX: 20.73 KG/M2 | SYSTOLIC BLOOD PRESSURE: 102 MMHG | DIASTOLIC BLOOD PRESSURE: 68 MMHG | HEART RATE: 78 BPM | TEMPERATURE: 99.1 F | HEIGHT: 63 IN | OXYGEN SATURATION: 99 % | WEIGHT: 117 LBS

## 2019-06-26 DIAGNOSIS — Z13.29 SCREENING FOR ENDOCRINE DISORDER: ICD-10-CM

## 2019-06-26 DIAGNOSIS — Z00.00 WELL ADULT EXAM: ICD-10-CM

## 2019-06-26 DIAGNOSIS — Z12.4 SCREENING FOR CERVICAL CANCER: ICD-10-CM

## 2019-06-26 DIAGNOSIS — Z13.0 SCREENING FOR DEFICIENCY ANEMIA: ICD-10-CM

## 2019-06-26 DIAGNOSIS — T78.3XXA ANGIOEDEMA, INITIAL ENCOUNTER: ICD-10-CM

## 2019-06-26 PROCEDURE — 99395 PREV VISIT EST AGE 18-39: CPT | Performed by: FAMILY MEDICINE

## 2019-06-26 PROCEDURE — 87624 HPV HI-RISK TYP POOLED RSLT: CPT

## 2019-06-26 PROCEDURE — 88175 CYTOPATH C/V AUTO FLUID REDO: CPT

## 2019-06-26 ASSESSMENT — PATIENT HEALTH QUESTIONNAIRE - PHQ9: CLINICAL INTERPRETATION OF PHQ2 SCORE: 0

## 2019-06-27 DIAGNOSIS — Z12.4 SCREENING FOR CERVICAL CANCER: ICD-10-CM

## 2019-06-27 LAB
CYTOLOGY REG CYTOL: NORMAL
HPV HR 12 DNA CVX QL NAA+PROBE: NEGATIVE
HPV16 DNA SPEC QL NAA+PROBE: NEGATIVE
HPV18 DNA SPEC QL NAA+PROBE: NEGATIVE
SPECIMEN SOURCE: NORMAL

## 2019-07-05 ENCOUNTER — HOSPITAL ENCOUNTER (OUTPATIENT)
Dept: LAB | Facility: MEDICAL CENTER | Age: 34
End: 2019-07-05
Attending: FAMILY MEDICINE
Payer: COMMERCIAL

## 2019-07-05 ENCOUNTER — HOSPITAL ENCOUNTER (OUTPATIENT)
Dept: LAB | Facility: MEDICAL CENTER | Age: 34
End: 2019-07-05
Attending: ALLERGY & IMMUNOLOGY
Payer: COMMERCIAL

## 2019-07-05 DIAGNOSIS — Z13.29 SCREENING FOR ENDOCRINE DISORDER: ICD-10-CM

## 2019-07-05 DIAGNOSIS — Z00.00 WELL ADULT EXAM: ICD-10-CM

## 2019-07-05 DIAGNOSIS — Z13.0 SCREENING FOR DEFICIENCY ANEMIA: ICD-10-CM

## 2019-07-05 LAB
ALBUMIN SERPL BCP-MCNC: 4.2 G/DL (ref 3.2–4.9)
ALBUMIN/GLOB SERPL: 1.2 G/DL
ALP SERPL-CCNC: 51 U/L (ref 30–99)
ALT SERPL-CCNC: 8 U/L (ref 2–50)
ANION GAP SERPL CALC-SCNC: 8 MMOL/L (ref 0–11.9)
AST SERPL-CCNC: 15 U/L (ref 12–45)
BASOPHILS # BLD AUTO: 0.7 % (ref 0–1.8)
BASOPHILS # BLD AUTO: 0.7 % (ref 0–1.8)
BASOPHILS # BLD: 0.03 K/UL (ref 0–0.12)
BASOPHILS # BLD: 0.03 K/UL (ref 0–0.12)
BILIRUB SERPL-MCNC: 0.8 MG/DL (ref 0.1–1.5)
BUN SERPL-MCNC: 8 MG/DL (ref 8–22)
C4 SERPL-MCNC: 50 MG/DL (ref 19–52)
CALCIUM SERPL-MCNC: 9.3 MG/DL (ref 8.5–10.5)
CHLORIDE SERPL-SCNC: 106 MMOL/L (ref 96–112)
CO2 SERPL-SCNC: 25 MMOL/L (ref 20–33)
CREAT SERPL-MCNC: 0.61 MG/DL (ref 0.5–1.4)
CRP SERPL HS-MCNC: 0.02 MG/DL (ref 0–0.75)
EOSINOPHIL # BLD AUTO: 0.07 K/UL (ref 0–0.51)
EOSINOPHIL # BLD AUTO: 0.08 K/UL (ref 0–0.51)
EOSINOPHIL NFR BLD: 1.6 % (ref 0–6.9)
EOSINOPHIL NFR BLD: 1.8 % (ref 0–6.9)
ERYTHROCYTE [DISTWIDTH] IN BLOOD BY AUTOMATED COUNT: 41.3 FL (ref 35.9–50)
ERYTHROCYTE [DISTWIDTH] IN BLOOD BY AUTOMATED COUNT: 41.5 FL (ref 35.9–50)
GLOBULIN SER CALC-MCNC: 3.4 G/DL (ref 1.9–3.5)
GLUCOSE SERPL-MCNC: 78 MG/DL (ref 65–99)
HCT VFR BLD AUTO: 40.9 % (ref 37–47)
HCT VFR BLD AUTO: 41.4 % (ref 37–47)
HGB BLD-MCNC: 13.5 G/DL (ref 12–16)
HGB BLD-MCNC: 13.6 G/DL (ref 12–16)
IMM GRANULOCYTES # BLD AUTO: 0.01 K/UL (ref 0–0.11)
IMM GRANULOCYTES # BLD AUTO: 0.01 K/UL (ref 0–0.11)
IMM GRANULOCYTES NFR BLD AUTO: 0.2 % (ref 0–0.9)
IMM GRANULOCYTES NFR BLD AUTO: 0.2 % (ref 0–0.9)
LYMPHOCYTES # BLD AUTO: 1.32 K/UL (ref 1–4.8)
LYMPHOCYTES # BLD AUTO: 1.34 K/UL (ref 1–4.8)
LYMPHOCYTES NFR BLD: 30.5 % (ref 22–41)
LYMPHOCYTES NFR BLD: 30.9 % (ref 22–41)
MCH RBC QN AUTO: 30.3 PG (ref 27–33)
MCH RBC QN AUTO: 30.4 PG (ref 27–33)
MCHC RBC AUTO-ENTMCNC: 32.9 G/DL (ref 33.6–35)
MCHC RBC AUTO-ENTMCNC: 33 G/DL (ref 33.6–35)
MCV RBC AUTO: 92.1 FL (ref 81.4–97.8)
MCV RBC AUTO: 92.2 FL (ref 81.4–97.8)
MONOCYTES # BLD AUTO: 0.34 K/UL (ref 0–0.85)
MONOCYTES # BLD AUTO: 0.38 K/UL (ref 0–0.85)
MONOCYTES NFR BLD AUTO: 7.9 % (ref 0–13.4)
MONOCYTES NFR BLD AUTO: 8.8 % (ref 0–13.4)
NEUTROPHILS # BLD AUTO: 2.49 K/UL (ref 2–7.15)
NEUTROPHILS # BLD AUTO: 2.56 K/UL (ref 2–7.15)
NEUTROPHILS NFR BLD: 57.6 % (ref 44–72)
NEUTROPHILS NFR BLD: 59.1 % (ref 44–72)
NRBC # BLD AUTO: 0 K/UL
NRBC # BLD AUTO: 0 K/UL
NRBC BLD-RTO: 0 /100 WBC
NRBC BLD-RTO: 0 /100 WBC
PLATELET # BLD AUTO: 217 K/UL (ref 164–446)
PLATELET # BLD AUTO: 221 K/UL (ref 164–446)
PMV BLD AUTO: 11.5 FL (ref 9–12.9)
PMV BLD AUTO: 11.5 FL (ref 9–12.9)
POTASSIUM SERPL-SCNC: 4.1 MMOL/L (ref 3.6–5.5)
PROT SERPL-MCNC: 7.6 G/DL (ref 6–8.2)
RBC # BLD AUTO: 4.44 M/UL (ref 4.2–5.4)
RBC # BLD AUTO: 4.49 M/UL (ref 4.2–5.4)
SODIUM SERPL-SCNC: 139 MMOL/L (ref 135–145)
TSH SERPL DL<=0.005 MIU/L-ACNC: 0.62 UIU/ML (ref 0.38–5.33)
WBC # BLD AUTO: 4.3 K/UL (ref 4.8–10.8)
WBC # BLD AUTO: 4.3 K/UL (ref 4.8–10.8)

## 2019-07-05 PROCEDURE — 85025 COMPLETE CBC W/AUTO DIFF WBC: CPT

## 2019-07-05 PROCEDURE — 36415 COLL VENOUS BLD VENIPUNCTURE: CPT

## 2019-07-05 PROCEDURE — 86038 ANTINUCLEAR ANTIBODIES: CPT

## 2019-07-05 PROCEDURE — 82787 IGG 1 2 3 OR 4 EACH: CPT | Mod: 91

## 2019-07-05 PROCEDURE — 86140 C-REACTIVE PROTEIN: CPT

## 2019-07-05 PROCEDURE — 83520 IMMUNOASSAY QUANT NOS NONAB: CPT

## 2019-07-05 PROCEDURE — 82784 ASSAY IGA/IGD/IGG/IGM EACH: CPT

## 2019-07-05 PROCEDURE — 86160 COMPLEMENT ANTIGEN: CPT

## 2019-07-05 PROCEDURE — 86039 ANTINUCLEAR ANTIBODIES (ANA): CPT

## 2019-07-05 PROCEDURE — 82785 ASSAY OF IGE: CPT

## 2019-07-05 PROCEDURE — 86800 THYROGLOBULIN ANTIBODY: CPT

## 2019-07-05 PROCEDURE — 85025 COMPLETE CBC W/AUTO DIFF WBC: CPT | Mod: 91

## 2019-07-05 PROCEDURE — 83516 IMMUNOASSAY NONANTIBODY: CPT

## 2019-07-05 PROCEDURE — 80053 COMPREHEN METABOLIC PANEL: CPT

## 2019-07-05 PROCEDURE — 86161 COMPLEMENT/FUNCTION ACTIVITY: CPT

## 2019-07-05 PROCEDURE — 84443 ASSAY THYROID STIM HORMONE: CPT

## 2019-07-07 LAB
IGA SERPL-MCNC: 229 MG/DL (ref 68–408)
IGG SERPL-MCNC: 1420 MG/DL (ref 768–1632)
IGG1 SER-MCNC: 614 MG/DL (ref 240–1118)
IGG2 SER-MCNC: 784 MG/DL (ref 124–549)
IGG3 SER-MCNC: 73 MG/DL (ref 21–134)
IGG4 SER-MCNC: 106 MG/DL (ref 1–123)
IGM SERPL-MCNC: 103 MG/DL (ref 35–263)
NUCLEAR IGG SER QL IA: DETECTED
THYROGLOB AB SERPL-ACNC: <0.9 IU/ML (ref 0–4)
TRYPTASE SERPL-MCNC: 3.1 UG/L

## 2019-07-08 LAB
GLIADIN PEPTIDE+TTG IGA+IGG SER QL IA: 5 UNITS (ref 0–19)
IGE SERPL-ACNC: 433 KU/L

## 2019-07-09 LAB
ANA INTERPRETIVE COMMENT Q5143: ABNORMAL
ANA PATTERN Q5144: ABNORMAL
ANA TITER Q5145: ABNORMAL
ANTINUCLEAR ANTIBODY (ANA), HEP-2, IGG Q5142: DETECTED
CYTOPLASMIC PATTERN TITER Q5148: ABNORMAL

## 2019-07-10 LAB — C1INH FUNCTIONAL/C1INH TOTAL MFR SERPL: 87 %

## 2019-08-27 ENCOUNTER — APPOINTMENT (OUTPATIENT)
Dept: ENDOCRINOLOGY | Facility: MEDICAL CENTER | Age: 34
End: 2019-08-27
Payer: COMMERCIAL

## 2019-08-28 ENCOUNTER — HOSPITAL ENCOUNTER (OUTPATIENT)
Dept: LAB | Facility: MEDICAL CENTER | Age: 34
End: 2019-08-28
Attending: INTERNAL MEDICINE
Payer: COMMERCIAL

## 2019-08-28 DIAGNOSIS — E22.1 HYPERPROLACTINEMIA (HCC): ICD-10-CM

## 2019-08-28 DIAGNOSIS — E53.8 VITAMIN B12 DEFICIENCY: ICD-10-CM

## 2019-08-28 DIAGNOSIS — E55.9 VITAMIN D DEFICIENCY: ICD-10-CM

## 2019-08-28 LAB
25(OH)D3 SERPL-MCNC: 37 NG/ML (ref 30–100)
PROLACTIN SERPL-MCNC: 25.88 NG/ML (ref 2.8–26)
VIT B12 SERPL-MCNC: 638 PG/ML (ref 211–911)

## 2019-08-28 PROCEDURE — 82607 VITAMIN B-12: CPT

## 2019-08-28 PROCEDURE — 82306 VITAMIN D 25 HYDROXY: CPT

## 2019-08-28 PROCEDURE — 36415 COLL VENOUS BLD VENIPUNCTURE: CPT

## 2019-08-28 PROCEDURE — 84146 ASSAY OF PROLACTIN: CPT

## 2019-09-19 PROBLEM — E55.9 VITAMIN D DEFICIENCY: Status: ACTIVE | Noted: 2019-09-19

## 2019-09-19 NOTE — PROGRESS NOTES
Endocrinology Clinic Progress Note  PCP: Micaela Kelsey M.D.    HPI:  Danni Joseph is a 34 y.o. old patient who comes in today for review of endocrine problems.   Denies having any changes in health since her last visit.     Hyperprolactinemia:  Currently taking Bromocriptine 2.5 mg daily every other day.     Ref. Range 8/28/2019 13:08   Prolactin Latest Ref Range: 2.80 - 26.00 ng/mL 25.88        Vitamin D Deficiency:  Vitamin D 50,000 units weekly but often forgets.        Ref. Range 8/28/2019 13:08   25-Hydroxy   Vitamin D 25 Latest Ref Range: 30 - 100 ng/mL 37         ROS:  Constitutional: No weight loss  Cardiac: No palpitations or racing heart  Resp: No shortness of breath  Neuro: No numbness or tinging in feet  Endo: No heat or cold intolerance, no polyuria or polydipsia  All other systems were reviewed and were negative.    Past Medical History:  Patient Active Problem List    Diagnosis Date Noted   • Vitamin D deficiency 09/19/2019   • Prolactinoma ([facility]) 05/03/2017   • Vertigo 11/05/2013   • Gastritis 11/05/2013   • Hyperprolactinemia ([facility]) 11/05/2013   • Nipple discharge 07/31/2013   • Acne 07/31/2013       Past Surgical History:  Past Surgical History:   Procedure Laterality Date   • EYE SURGERY      LASIC       Allergies:  Nkda [no known drug allergy]    Social History:  Social History     Socioeconomic History   • Marital status: Single     Spouse name: Not on file   • Number of children: Not on file   • Years of education: Not on file   • Highest education level: Not on file   Occupational History     Employer: [employer]      Comment:  AT [employer]    Social Needs   • Financial resource strain: Not on file   • Food insecurity:     Worry: Not on file     Inability: Not on file   • Transportation needs:     Medical: Not on file     Non-medical: Not on file   Tobacco Use   • Smoking status: Never Smoker   • Smokeless tobacco: Never Used   Substance and Sexual Activity   • Alcohol use: No     Alcohol/week: 0.0 oz  "  • Drug use: No   • Sexual activity: Not Currently   Lifestyle   • Physical activity:     Days per week: Not on file     Minutes per session: Not on file   • Stress: Not on file   Relationships   • Social connections:     Talks on phone: Not on file     Gets together: Not on file     Attends Islam service: Not on file     Active member of club or organization: Not on file     Attends meetings of clubs or organizations: Not on file     Relationship status: Not on file   • Intimate partner violence:     Fear of current or ex partner: Not on file     Emotionally abused: Not on file     Physically abused: Not on file     Forced sexual activity: Not on file   Other Topics Concern   • Not on file   Social History Narrative   • Not on file       Family History:  Family History   Problem Relation Age of Onset   • Hypertension Mother        Medications:    Current Outpatient Medications:   •  bromocriptine (PARLODEL) 2.5 MG Tab, Take 1 Tab by mouth every day. (Patient taking differently: Take 2.5 mg by mouth every 48 hours.), Disp: 90 Tab, Rfl: 3  •  fluticasone (FLONASE) 50 MCG/ACT nasal spray, Spray 2 Sprays in nose every day. Each Nostril, Disp: 16 g, Rfl: 11  •  Cyanocobalamin (VITAMIN B-12) 1000 MCG SL Tab, Place 1 Tab under tongue every day., Disp: 30 Tab, Rfl: 3  •  vitamin D, Ergocalciferol, (DRISDOL) 81997 units Cap capsule, Take 1 Cap by mouth every 14 days., Disp: 6 Cap, Rfl: 1    Labs: Reviewed    Physical Examination:  Vital signs: /70 (BP Location: Right arm, Patient Position: Sitting, BP Cuff Size: Adult)   Pulse 77   Ht 1.6 m (5' 3\")   Wt 54 kg (119 lb)   SpO2 98%   BMI 21.08 kg/m²  Body mass index is 21.08 kg/m².  General: No apparent distress, cooperative  Eyes: No scleral icterus or discharge  ENMT: Normal on external inspection of nose, lips, normal thyroid exam  Neck: No abnormal masses on inspection  Resp: Normal effort, clear to auscultation bilaterally   CVS: Regular rate and rhythm, " S1 S2 normal, no murmur   Extremities: No edema  Abdomen: no abdominal obesity present  Neuro: Alert and oriented  Skin: No rash  Psych: Normal mood and affect, intact memory and able to make informed decisions    Assessment and Plan:  1. Prolactinoma (HCC)  Continue bromocriptine daily.     2. Vitamin D deficiency  To take vit D more regularly.     Return in about 5 months (around 2/23/2020).    Thank you for allowing me to participate in the care of this patient.    Julián Waller M.D.  09/19/19    CC:   Micaela Kelsey M.D.    This note was created using voice recognition software (Dragon). The accuracy of the dictation is limited by the abilities of the software. I have reviewed the note prior to signing, however some errors in grammar and context are still possible. If you have any questions related to this note please do not hesitate to contact our office.   This note was scribed by Toshia David RN, CDE

## 2019-09-23 ENCOUNTER — OFFICE VISIT (OUTPATIENT)
Dept: ENDOCRINOLOGY | Facility: MEDICAL CENTER | Age: 34
End: 2019-09-23
Payer: COMMERCIAL

## 2019-09-23 VITALS
OXYGEN SATURATION: 98 % | WEIGHT: 119 LBS | BODY MASS INDEX: 21.09 KG/M2 | SYSTOLIC BLOOD PRESSURE: 102 MMHG | HEART RATE: 77 BPM | HEIGHT: 63 IN | DIASTOLIC BLOOD PRESSURE: 70 MMHG

## 2019-09-23 DIAGNOSIS — D35.2 PROLACTINOMA (HCC): ICD-10-CM

## 2019-09-23 DIAGNOSIS — E55.9 VITAMIN D DEFICIENCY: ICD-10-CM

## 2019-09-23 DIAGNOSIS — E53.8 VITAMIN B 12 DEFICIENCY: ICD-10-CM

## 2019-09-23 PROCEDURE — 99214 OFFICE O/P EST MOD 30 MIN: CPT | Performed by: INTERNAL MEDICINE

## 2020-01-06 DIAGNOSIS — E22.1 HYPERPROLACTINEMIA (HCC): ICD-10-CM

## 2020-01-06 RX ORDER — BROMOCRIPTINE MESYLATE 2.5 MG/1
TABLET ORAL
Qty: 90 TAB | Refills: 0 | Status: SHIPPED | OUTPATIENT
Start: 2020-01-06 | End: 2020-01-16 | Stop reason: SDUPTHER

## 2020-01-06 NOTE — TELEPHONE ENCOUNTER
Was the patient seen in the last year in this department? yes LOV: 09/23/19    Does patient have an active prescription for medications requested? No     Received Request Via: Pharmacy     bromocriptine (PARLODEL) 2.5 MG Tab        Sig: TAKE 1 TABLET BY MOUTH EVERY DAY

## 2020-01-16 DIAGNOSIS — E22.1 HYPERPROLACTINEMIA (HCC): ICD-10-CM

## 2020-01-17 RX ORDER — BROMOCRIPTINE MESYLATE 2.5 MG/1
2.5 TABLET ORAL
Qty: 90 TAB | Refills: 0 | Status: SHIPPED | OUTPATIENT
Start: 2020-01-17 | End: 2020-03-08 | Stop reason: SDUPTHER

## 2020-01-17 NOTE — TELEPHONE ENCOUNTER
Was the patient seen in the last year in this department? Yes    Does patient have an active prescription for medications requested? Yes    Received Request Via: Pharmacy     bromocriptine (PARLODEL) 2.5 MG Tab            Sig: Take 1 Tab by mouth every day.

## 2020-03-08 DIAGNOSIS — E22.1 HYPERPROLACTINEMIA (HCC): ICD-10-CM

## 2020-03-09 DIAGNOSIS — E22.1 HYPERPROLACTINEMIA (HCC): ICD-10-CM

## 2020-03-09 RX ORDER — BROMOCRIPTINE MESYLATE 2.5 MG/1
2.5 TABLET ORAL
Qty: 90 TAB | Refills: 0 | Status: SHIPPED | OUTPATIENT
Start: 2020-03-09 | End: 2020-11-10 | Stop reason: SDUPTHER

## 2020-03-09 RX ORDER — BROMOCRIPTINE MESYLATE 2.5 MG/1
2.5 TABLET ORAL
Qty: 90 TAB | Refills: 0 | Status: SHIPPED | OUTPATIENT
Start: 2020-03-09 | End: 2020-03-09 | Stop reason: SDUPTHER

## 2020-11-10 DIAGNOSIS — E22.1 HYPERPROLACTINEMIA (HCC): ICD-10-CM

## 2020-11-11 RX ORDER — BROMOCRIPTINE MESYLATE 2.5 MG/1
2.5 TABLET ORAL
Qty: 90 TAB | Refills: 0 | Status: SHIPPED | OUTPATIENT
Start: 2020-11-11

## 2020-11-11 NOTE — TELEPHONE ENCOUNTER
From: Danni Joseph  To: Office of Julián Waller M.D.  Sent: 11/10/2020 10:37 PM PST  Subject: Medication Renewal Request    Refills have been requested for the following medications:     bromocriptine (PARLODEL) 2.5 MG Tab [Julián Waller M.D.]    Preferred pharmacy: [address]  Delivery method: Pickup

## 2023-01-11 NOTE — TELEPHONE ENCOUNTER
PSR's please inform client refill sent to [facility] pharmacy as requested.  Thank you,  Eleanor DAVIS     Received request via: Patient    Was the patient seen in the last year in this department? Yes    Does the patient have an active prescription (recently filled or refills available) for medication(s) requested? No     bromocriptine (PARLODEL) 2.5 MG Tab               Sig: Take 1 Tab by mouth every day.